# Patient Record
Sex: MALE | Race: OTHER | URBAN - METROPOLITAN AREA
[De-identification: names, ages, dates, MRNs, and addresses within clinical notes are randomized per-mention and may not be internally consistent; named-entity substitution may affect disease eponyms.]

---

## 2021-08-15 ENCOUNTER — INPATIENT (INPATIENT)
Facility: HOSPITAL | Age: 46
LOS: 3 days | Discharge: HOME | End: 2021-08-19
Attending: INTERNAL MEDICINE | Admitting: INTERNAL MEDICINE
Payer: COMMERCIAL

## 2021-08-15 VITALS
TEMPERATURE: 98 F | HEART RATE: 87 BPM | DIASTOLIC BLOOD PRESSURE: 120 MMHG | SYSTOLIC BLOOD PRESSURE: 217 MMHG | RESPIRATION RATE: 18 BRPM | WEIGHT: 255.07 LBS | OXYGEN SATURATION: 98 %

## 2021-08-15 LAB
ALBUMIN SERPL ELPH-MCNC: 4 G/DL — SIGNIFICANT CHANGE UP (ref 3.5–5.2)
ALP SERPL-CCNC: 72 U/L — SIGNIFICANT CHANGE UP (ref 30–115)
ALT FLD-CCNC: 19 U/L — SIGNIFICANT CHANGE UP (ref 0–41)
ANION GAP SERPL CALC-SCNC: 11 MMOL/L — SIGNIFICANT CHANGE UP (ref 7–14)
APPEARANCE UR: CLEAR — SIGNIFICANT CHANGE UP
APTT BLD: 34 SEC — SIGNIFICANT CHANGE UP (ref 27–39.2)
AST SERPL-CCNC: 22 U/L — SIGNIFICANT CHANGE UP (ref 0–41)
BACTERIA # UR AUTO: NEGATIVE — SIGNIFICANT CHANGE UP
BASOPHILS # BLD AUTO: 0.03 K/UL — SIGNIFICANT CHANGE UP (ref 0–0.2)
BASOPHILS NFR BLD AUTO: 0.4 % — SIGNIFICANT CHANGE UP (ref 0–1)
BILIRUB SERPL-MCNC: 0.2 MG/DL — SIGNIFICANT CHANGE UP (ref 0.2–1.2)
BILIRUB UR-MCNC: NEGATIVE — SIGNIFICANT CHANGE UP
BUN SERPL-MCNC: 26 MG/DL — HIGH (ref 10–20)
CALCIUM SERPL-MCNC: 9.9 MG/DL — SIGNIFICANT CHANGE UP (ref 8.5–10.1)
CHLORIDE SERPL-SCNC: 106 MMOL/L — SIGNIFICANT CHANGE UP (ref 98–110)
CO2 SERPL-SCNC: 25 MMOL/L — SIGNIFICANT CHANGE UP (ref 17–32)
COLOR SPEC: SIGNIFICANT CHANGE UP
CREAT SERPL-MCNC: 2.8 MG/DL — HIGH (ref 0.7–1.5)
DIFF PNL FLD: ABNORMAL
EOSINOPHIL # BLD AUTO: 0.17 K/UL — SIGNIFICANT CHANGE UP (ref 0–0.7)
EOSINOPHIL NFR BLD AUTO: 2.5 % — SIGNIFICANT CHANGE UP (ref 0–8)
EPI CELLS # UR: 1 /HPF — SIGNIFICANT CHANGE UP (ref 0–5)
GLUCOSE SERPL-MCNC: 96 MG/DL — SIGNIFICANT CHANGE UP (ref 70–99)
GLUCOSE UR QL: SIGNIFICANT CHANGE UP
HCT VFR BLD CALC: 41.1 % — LOW (ref 42–52)
HGB BLD-MCNC: 13.2 G/DL — LOW (ref 14–18)
HYALINE CASTS # UR AUTO: 2 /LPF — SIGNIFICANT CHANGE UP (ref 0–7)
IMM GRANULOCYTES NFR BLD AUTO: 0.3 % — SIGNIFICANT CHANGE UP (ref 0.1–0.3)
INR BLD: 0.9 RATIO — SIGNIFICANT CHANGE UP (ref 0.65–1.3)
KETONES UR-MCNC: NEGATIVE — SIGNIFICANT CHANGE UP
LEUKOCYTE ESTERASE UR-ACNC: NEGATIVE — SIGNIFICANT CHANGE UP
LYMPHOCYTES # BLD AUTO: 2.28 K/UL — SIGNIFICANT CHANGE UP (ref 1.2–3.4)
LYMPHOCYTES # BLD AUTO: 34.1 % — SIGNIFICANT CHANGE UP (ref 20.5–51.1)
MCHC RBC-ENTMCNC: 24 PG — LOW (ref 27–31)
MCHC RBC-ENTMCNC: 32.1 G/DL — SIGNIFICANT CHANGE UP (ref 32–37)
MCV RBC AUTO: 74.7 FL — LOW (ref 80–94)
MONOCYTES # BLD AUTO: 0.41 K/UL — SIGNIFICANT CHANGE UP (ref 0.1–0.6)
MONOCYTES NFR BLD AUTO: 6.1 % — SIGNIFICANT CHANGE UP (ref 1.7–9.3)
NEUTROPHILS # BLD AUTO: 3.77 K/UL — SIGNIFICANT CHANGE UP (ref 1.4–6.5)
NEUTROPHILS NFR BLD AUTO: 56.6 % — SIGNIFICANT CHANGE UP (ref 42.2–75.2)
NITRITE UR-MCNC: NEGATIVE — SIGNIFICANT CHANGE UP
NRBC # BLD: 0 /100 WBCS — SIGNIFICANT CHANGE UP (ref 0–0)
PH UR: 7.5 — SIGNIFICANT CHANGE UP (ref 5–8)
PLATELET # BLD AUTO: 207 K/UL — SIGNIFICANT CHANGE UP (ref 130–400)
POTASSIUM SERPL-MCNC: 4.1 MMOL/L — SIGNIFICANT CHANGE UP (ref 3.5–5)
POTASSIUM SERPL-SCNC: 4.1 MMOL/L — SIGNIFICANT CHANGE UP (ref 3.5–5)
PROT SERPL-MCNC: 6.8 G/DL — SIGNIFICANT CHANGE UP (ref 6–8)
PROT UR-MCNC: ABNORMAL
PROTHROM AB SERPL-ACNC: 10.4 SEC — SIGNIFICANT CHANGE UP (ref 9.95–12.87)
RBC # BLD: 5.5 M/UL — SIGNIFICANT CHANGE UP (ref 4.7–6.1)
RBC # FLD: 16 % — HIGH (ref 11.5–14.5)
RBC CASTS # UR COMP ASSIST: 8 /HPF — HIGH (ref 0–4)
SARS-COV-2 RNA SPEC QL NAA+PROBE: SIGNIFICANT CHANGE UP
SODIUM SERPL-SCNC: 142 MMOL/L — SIGNIFICANT CHANGE UP (ref 135–146)
SP GR SPEC: 1.01 — SIGNIFICANT CHANGE UP (ref 1.01–1.03)
UROBILINOGEN FLD QL: SIGNIFICANT CHANGE UP
WBC # BLD: 6.68 K/UL — SIGNIFICANT CHANGE UP (ref 4.8–10.8)
WBC # FLD AUTO: 6.68 K/UL — SIGNIFICANT CHANGE UP (ref 4.8–10.8)
WBC UR QL: 2 /HPF — SIGNIFICANT CHANGE UP (ref 0–5)

## 2021-08-15 PROCEDURE — 74176 CT ABD & PELVIS W/O CONTRAST: CPT | Mod: 26

## 2021-08-15 PROCEDURE — 99285 EMERGENCY DEPT VISIT HI MDM: CPT

## 2021-08-15 RX ORDER — AMLODIPINE BESYLATE 2.5 MG/1
10 TABLET ORAL DAILY
Refills: 0 | Status: DISCONTINUED | OUTPATIENT
Start: 2021-08-15 | End: 2021-08-19

## 2021-08-15 RX ORDER — SODIUM CHLORIDE 9 MG/ML
1000 INJECTION INTRAMUSCULAR; INTRAVENOUS; SUBCUTANEOUS ONCE
Refills: 0 | Status: COMPLETED | OUTPATIENT
Start: 2021-08-15 | End: 2021-08-15

## 2021-08-15 RX ORDER — LABETALOL HCL 100 MG
200 TABLET ORAL EVERY 8 HOURS
Refills: 0 | Status: DISCONTINUED | OUTPATIENT
Start: 2021-08-15 | End: 2021-08-16

## 2021-08-15 RX ORDER — HYDRALAZINE HCL 50 MG
10 TABLET ORAL ONCE
Refills: 0 | Status: COMPLETED | OUTPATIENT
Start: 2021-08-15 | End: 2021-08-15

## 2021-08-15 RX ORDER — ALLOPURINOL 300 MG
100 TABLET ORAL DAILY
Refills: 0 | Status: DISCONTINUED | OUTPATIENT
Start: 2021-08-15 | End: 2021-08-19

## 2021-08-15 RX ORDER — ATORVASTATIN CALCIUM 80 MG/1
20 TABLET, FILM COATED ORAL AT BEDTIME
Refills: 0 | Status: DISCONTINUED | OUTPATIENT
Start: 2021-08-15 | End: 2021-08-19

## 2021-08-15 RX ADMIN — SODIUM CHLORIDE 1000 MILLILITER(S): 9 INJECTION INTRAMUSCULAR; INTRAVENOUS; SUBCUTANEOUS at 14:19

## 2021-08-15 RX ADMIN — ATORVASTATIN CALCIUM 20 MILLIGRAM(S): 80 TABLET, FILM COATED ORAL at 21:11

## 2021-08-15 RX ADMIN — AMLODIPINE BESYLATE 10 MILLIGRAM(S): 2.5 TABLET ORAL at 17:08

## 2021-08-15 RX ADMIN — Medication 10 MILLIGRAM(S): at 19:07

## 2021-08-15 NOTE — H&P ADULT - NSHPLABSRESULTS_GEN_ALL_CORE
(08-15 @ 13:28)                      13.2  6.68 )-----------( 207                 41.1    Neutrophils = 3.77 (56.6%)  Lymphocytes = 2.28 (34.1%)  Eosinophils = 0.17 (2.5%)  Basophils = 0.03 (0.4%)  Monocytes = 0.41 (6.1%)  Bands = --%    08-15    142  |  106  |  26<H>  ----------------------------<  96  4.1   |  25  |  2.8<H>    Ca    9.9      15 Aug 2021 13:28    TPro  6.8  /  Alb  4.0  /  TBili  0.2  /  DBili  x   /  AST  22  /  ALT  19  /  AlkPhos  72  08-15    ( 15 Aug 2021 13:28 )   PT: 10.40 sec;   INR: 0.90 ratio;       PTT:34.0 sec      RVP:          Tox:         Urinalysis Basic - ( 15 Aug 2021 13:28 )    Color: Light Yellow / Appearance: Clear / S.013 / pH: x  Gluc: x / Ketone: Negative  / Bili: Negative / Urobili: <2 mg/dL   Blood: x / Protein: 300 mg/dL / Nitrite: Negative   Leuk Esterase: Negative / RBC: 8 /HPF / WBC 2 /HPF   Sq Epi: x / Non Sq Epi: 1 /HPF / Bacteria: Negative

## 2021-08-15 NOTE — H&P ADULT - NSHPPHYSICALEXAM_GEN_ALL_CORE
LOS:     VITALS:   T(C): 36.7 (08-15-21 @ 12:05), Max: 36.7 (08-15-21 @ 12:05)  HR: 87 (08-15-21 @ 12:05) (87 - 87)  BP: 170/80 (08-15-21 @ 14:41) (170/80 - 217/120)  RR: 18 (08-15-21 @ 12:05) (18 - 18)  SpO2: 98% (08-15-21 @ 12:05) (98% - 98%)    GENERAL: NAD, lying in bed comfortably  HEAD:  Atraumatic, Normocephalic  EYES: EOMI, PERRLA, conjunctiva and sclera clear  ENT: Moist mucous membranes  NECK: Supple, No JVD  CHEST/LUNG: Clear to auscultation bilaterally; No rales, rhonchi, wheezing, or rubs. Unlabored respirations  HEART: Regular rate and rhythm; No murmurs, rubs, or gallops  ABDOMEN: BSx4; Soft, nontender, nondistended  EXTREMITIES:  mild 1+ LLE  NERVOUS SYSTEM:  A&Ox3, no focal deficits   SKIN: No rashes or lesions

## 2021-08-15 NOTE — H&P ADULT - HISTORY OF PRESENT ILLNESS
45 year old man with hx of gout and HTN, presenting due to 2 week hx of hematuria. Patient reports that 2 weeks ago he started seeing some blood at the beginning of voiding. the hematuria is intermittent, not on a daily basis. with no other symptoms. no dysuria, urgency, frequency, back pain, or any other symptoms. no fever, chills, dyspnea, chest pain, diarrhea, hematochezia, melena, cough, or any other symptoms. patient hematuria has been the same since 2 weeks but not resolving. he drinks alcohol daily. non smoker. no FHx of renal disease. he reports his gout is under control with no flares in the past years. he is not sure why he has HTN and does not know if it is controlled or not    In the ED, his bp was 200/100. labs showed Cr. 2.8, with BUN 26. UA showed proteinuria and small hematuria.

## 2021-08-15 NOTE — ED PROVIDER NOTE - NS ED ROS FT
Constitutional: (-) fever  Eyes/ENT: (-) blurry vision, (-) epistaxis  Cardiovascular: (-) chest pain, (-) syncope  Respiratory: (-) cough, (-) shortness of breath  Gastrointestinal: (-) vomiting, (-) diarrhea  : (+) hematuria, (-) dysuria  Musculoskeletal: (-) neck pain, (-) back pain, (-) joint pain  Integumentary: (-) rash, (-) edema  Neurological: (-) headache, (-) altered mental status  Allergic/Immunologic: (-) pruritus

## 2021-08-15 NOTE — ED PROVIDER NOTE - CLINICAL SUMMARY MEDICAL DECISION MAKING FREE TEXT BOX
On initial evaluation patient noted to have a blood pressure of 213 systolic on evaluation which down trended to 173 systolic. 45-year-old male presents to the ED with complaints of hematuria. Concern for end organ damage. obtain labs. Unremarkable physical exam. Labs reviewed noted to have an elevated creatinine of 2.8 with unknown baseline. [place] of FORREST. UA positive for blood. Given fluids. Will admit for new onset FORREST secondary to hypertensive urgency.

## 2021-08-15 NOTE — ED ADULT NURSE NOTE - NSIMPLEMENTINTERV_GEN_ALL_ED
Implemented All Universal Safety Interventions:  [place] to call system. Call bell, personal items and telephone within reach. Instruct patient to call for assistance. Room bathroom lighting operational. Non-slip footwear when patient is off stretcher. Physically safe environment: no spills, clutter or unnecessary equipment. Stretcher in lowest position, wheels locked, appropriate side rails in place.

## 2021-08-15 NOTE — ED ADULT NURSE NOTE - OBJECTIVE STATEMENT
pt is a 45 year old man pw 2 week hx of hematuria. intermittent, not on a daily basis. with no other symptoms. no dysuria, urgency, frequency, back pain, or any other symptoms. no fever, chills, dyspnea, chest pain, diarrhea, hematochezia, melena, cough, or any other symptoms.

## 2021-08-15 NOTE — H&P ADULT - ATTENDING COMMENTS
45 year old man with hx of gout and HTN, presenting due to 2 week hx of hematuria noted with FORREST and HTN- admitted to r/o nephritic syndrome causes vs obstructive causes    - check repeat BMP  - CT abdomen/pelvis noncontrast  - US Renal and bladder  - Nephro consult  - Urology consult    ambulate  DVT proph    DC home when cleared by  and renal for outpt f/u- hopefully in 24 hrs

## 2021-08-15 NOTE — ED PROVIDER NOTE - OBJECTIVE STATEMENT
45y M pmh HTN, HLD, Gout presents for eval of hematuria. Pt has intermittent painless hematuria x2wks, no aggravating or relieving factors. Denies fever, ha, cp, sob, weakness, numbness, dysuria, hematuria, n/v/d/c, trauma

## 2021-08-15 NOTE — H&P ADULT - ASSESSMENT
45 year old man with hx of gout and HTN, presenting due to 2 week hx of hematuria    # FORREST and Hematuria -- r/o nephritic syndrome causes vs obstructive causes  - UA showing hematuria and proteinuria  - patient with uncontrolled HTN and mild edema  - Hb stable  - send urine lytes  - Urine protein/Creatinine ratio  - CT abdomen/pelvis noncontrast  - US Renal and bladder  - Nephro consult  - Urology consult      # Uncontrolled HTN  - unknown if primary or secondary HTN  - continue home amlodipine 10 mg qd  - holding Benazapril due to FORREST  - holding Chlorthalidone for now (patient reports he doesn't take it but pharmacy said he does)  - give labetolol or hydralazine if bp is uncontrolled    # Gout  - continue home allopurinol  - check uric acid level    # DVT prophylaxis: SCD  # GI proph: not needed  # Diet: DASH  # Dispo: acute   45 year old man with hx of gout and HTN, presenting due to 2 week hx of hematuria    # FORREST and Hematuria -- r/o nephritic syndrome causes vs obstructive causes  - UA showing hematuria and proteinuria  - patient with uncontrolled HTN and mild edema  - Hb stable  - send urine lytes  - Urine protein/Creatinine ratio  - CT abdomen/pelvis noncontrast  - US Renal and bladder  - Nephro consult  - Urology consult  - will send the following:  Serum C3 and C4 complement levels  Antineutrophil cytoplasmic autoantibodies (ANCA; using enzyme-linked immunosorbent assays [ELISAs] specific for proteinase-3 and myeloperoxidase)  Anti-glomerular basement membrane (GBM) autoantibodies  Antinuclear antibodies  Anti-dsDNA antibodies  Serology for hepatitis C virus, hepatitis B virus, and HIV  Serum free light chains and serum immunofixation      # Uncontrolled HTN  - unknown if primary or secondary HTN  - continue home amlodipine 10 mg qd  - holding Benazapril due to FORREST  - holding Chlorthalidone for now (patient reports he doesn't take it but pharmacy said he does)  - give labetolol or hydralazine if bp is uncontrolled    # Gout  - continue home allopurinol  - check uric acid level    # DVT prophylaxis: SCD  # GI proph: not needed  # Diet: DASH  # Dispo: acute   45 year old man with hx of gout and HTN, presenting due to 2 week hx of hematuria    # FORREST and Hematuria -- r/o nephritic syndrome causes vs obstructive causes  - UA showing hematuria and proteinuria  - patient with uncontrolled HTN and mild edema  - Hb stable  - send urine lytes  - Urine protein/Creatinine ratio  - CT abdomen/pelvis noncontrast  - US Renal and bladder  - Nephro consult  - Urology consult  - will send the following:  Serum C3 and C4 complement levels  Antineutrophil cytoplasmic autoantibodies (ANCA; using enzyme-linked immunosorbent assays [ELISAs] specific for proteinase-3 and myeloperoxidase)  Anti-glomerular basement membrane (GBM) autoantibodies  Antinuclear antibodies  Anti-dsDNA antibodies  Serology for hepatitis C virus, hepatitis B virus, and HIV  Serum free light chains and serum immunofixation      # Uncontrolled HTN  - unknown if primary or secondary HTN  - continue home amlodipine 10 mg qd  - holding Benazapril due to FORREST  - holding Chlorthalidone for now (patient reports he doesn't take it but pharmacy said he does)  - give labetolol or hydralazine if bp is uncontrolled  - do renal duplex Renal to r/o LV    # Gout  - continue home allopurinol  - check uric acid level    # DVT prophylaxis: SCD  # GI proph: not needed  # Diet: DASH  # Dispo: acute

## 2021-08-16 LAB
ALBUMIN SERPL ELPH-MCNC: 3.9 G/DL — SIGNIFICANT CHANGE UP (ref 3.5–5.2)
ALP SERPL-CCNC: 71 U/L — SIGNIFICANT CHANGE UP (ref 30–115)
ALT FLD-CCNC: 19 U/L — SIGNIFICANT CHANGE UP (ref 0–41)
ANION GAP SERPL CALC-SCNC: 12 MMOL/L — SIGNIFICANT CHANGE UP (ref 7–14)
AST SERPL-CCNC: 22 U/L — SIGNIFICANT CHANGE UP (ref 0–41)
BILIRUB SERPL-MCNC: 0.6 MG/DL — SIGNIFICANT CHANGE UP (ref 0.2–1.2)
BUN SERPL-MCNC: 26 MG/DL — HIGH (ref 10–20)
CALCIUM SERPL-MCNC: 9.8 MG/DL — SIGNIFICANT CHANGE UP (ref 8.5–10.1)
CHLORIDE SERPL-SCNC: 104 MMOL/L — SIGNIFICANT CHANGE UP (ref 98–110)
CO2 SERPL-SCNC: 24 MMOL/L — SIGNIFICANT CHANGE UP (ref 17–32)
CREAT ?TM UR-MCNC: 107 MG/DL — SIGNIFICANT CHANGE UP
CREAT SERPL-MCNC: 2.6 MG/DL — HIGH (ref 0.7–1.5)
CULTURE RESULTS: NO GROWTH — SIGNIFICANT CHANGE UP
GLUCOSE SERPL-MCNC: 102 MG/DL — HIGH (ref 70–99)
HCT VFR BLD CALC: 43.3 % — SIGNIFICANT CHANGE UP (ref 42–52)
HGB BLD-MCNC: 13.9 G/DL — LOW (ref 14–18)
MAGNESIUM SERPL-MCNC: 1.8 MG/DL — SIGNIFICANT CHANGE UP (ref 1.8–2.4)
MCHC RBC-ENTMCNC: 23.9 PG — LOW (ref 27–31)
MCHC RBC-ENTMCNC: 32.1 G/DL — SIGNIFICANT CHANGE UP (ref 32–37)
MCV RBC AUTO: 74.4 FL — LOW (ref 80–94)
NRBC # BLD: 0 /100 WBCS — SIGNIFICANT CHANGE UP (ref 0–0)
PCP SPEC-MCNC: SIGNIFICANT CHANGE UP
PLATELET # BLD AUTO: 224 K/UL — SIGNIFICANT CHANGE UP (ref 130–400)
POTASSIUM SERPL-MCNC: 4 MMOL/L — SIGNIFICANT CHANGE UP (ref 3.5–5)
POTASSIUM SERPL-SCNC: 4 MMOL/L — SIGNIFICANT CHANGE UP (ref 3.5–5)
PROT ?TM UR-MCNC: 71 MG/DLG/24H — SIGNIFICANT CHANGE UP
PROT ?TM UR-MCNC: 71 MG/DLG/24H — SIGNIFICANT CHANGE UP
PROT SERPL-MCNC: 6.8 G/DL — SIGNIFICANT CHANGE UP (ref 6–8)
PROT/CREAT UR-RTO: 0.7 RATIO — HIGH (ref 0–0.2)
RBC # BLD: 5.82 M/UL — SIGNIFICANT CHANGE UP (ref 4.7–6.1)
RBC # FLD: 17.1 % — HIGH (ref 11.5–14.5)
SODIUM SERPL-SCNC: 140 MMOL/L — SIGNIFICANT CHANGE UP (ref 135–146)
SPECIMEN SOURCE: SIGNIFICANT CHANGE UP
URATE SERPL-MCNC: 8.2 MG/DL — SIGNIFICANT CHANGE UP (ref 3.4–8.8)
WBC # BLD: 10.48 K/UL — SIGNIFICANT CHANGE UP (ref 4.8–10.8)
WBC # FLD AUTO: 10.48 K/UL — SIGNIFICANT CHANGE UP (ref 4.8–10.8)

## 2021-08-16 PROCEDURE — 93975 VASCULAR STUDY: CPT | Mod: 26

## 2021-08-16 PROCEDURE — 76775 US EXAM ABDO BACK WALL LIM: CPT | Mod: 26

## 2021-08-16 RX ORDER — HYDRALAZINE HCL 50 MG
5 TABLET ORAL ONCE
Refills: 0 | Status: COMPLETED | OUTPATIENT
Start: 2021-08-16 | End: 2021-08-16

## 2021-08-16 RX ORDER — ACETAMINOPHEN 500 MG
650 TABLET ORAL EVERY 8 HOURS
Refills: 0 | Status: DISCONTINUED | OUTPATIENT
Start: 2021-08-16 | End: 2021-08-19

## 2021-08-16 RX ORDER — LABETALOL HCL 100 MG
400 TABLET ORAL EVERY 8 HOURS
Refills: 0 | Status: DISCONTINUED | OUTPATIENT
Start: 2021-08-16 | End: 2021-08-19

## 2021-08-16 RX ADMIN — Medication 5 MILLIGRAM(S): at 06:30

## 2021-08-16 RX ADMIN — AMLODIPINE BESYLATE 10 MILLIGRAM(S): 2.5 TABLET ORAL at 05:03

## 2021-08-16 RX ADMIN — ATORVASTATIN CALCIUM 20 MILLIGRAM(S): 80 TABLET, FILM COATED ORAL at 22:29

## 2021-08-16 RX ADMIN — Medication 650 MILLIGRAM(S): at 06:23

## 2021-08-16 RX ADMIN — Medication 400 MILLIGRAM(S): at 22:30

## 2021-08-16 RX ADMIN — Medication 100 MILLIGRAM(S): at 11:35

## 2021-08-16 RX ADMIN — Medication 200 MILLIGRAM(S): at 05:03

## 2021-08-16 RX ADMIN — Medication 650 MILLIGRAM(S): at 05:07

## 2021-08-16 RX ADMIN — Medication 400 MILLIGRAM(S): at 14:13

## 2021-08-16 NOTE — CONSULT NOTE ADULT - ATTENDING COMMENTS
Attempted to see patient twice today / not in room / will attempt to see in AM   Case discussed with medical resident and renal resident   Agree with note A and plan   # FORREST/ HTN / hematuria / ? proteinuria  rule out nephritic syndrome   ok to use CCB and beta blocker for BP management  CT and sono no evidence of mass or stone-obstruction   GN work up as

## 2021-08-16 NOTE — CONSULT NOTE ADULT - ASSESSMENT
45 year old man with hx of gout and HTN, presents due to 2 week hx of hematuria. Found to have 300 mg/dL protein + small blood on UA    #) Nephrotic vs. possible nephritic syndrome  - With long standing nephrotic syndrome, would expect a low albumin level and potentially darren coagulopathy (not seen here)   - No evidence of systemic disease at this time (e.g. amyloidosis, lupus)  - May have a manifestation of acute injury - given his high blood pressure, potential for hypertensive emergency  - Still, his hypertension may be a result of kidney disease rather than a cause. Recommend the following:                --> BP control               --> 24 H urine protein collection, lipid panel, complement studies, hepatitis panel, BIBIANA, ANCA, HIV, immunoglobulins, double-stranded DNA               --> Imaging studies may be useful; CT abdomen/pelvis performed (results pending), US renal and VA duplex kidneys pending                --> If above does not elucidate an etiology, kidney biopsy may be needed (differential includes focal segmental glomerulopathy, membranous nephropathy)      45 year old man with hx of gout and HTN, presents due to 2 week hx of hematuria. Found to have 300 mg/dL protein + small blood on UA    #) Nephrotic vs. possible nephritic syndrome  - With long standing nephrotic syndrome, would expect a low albumin level and potentially darren coagulopathy (not seen here)   - No evidence of systemic disease at this time (e.g. amyloidosis, lupus)  - May have a manifestation of acute injury - given his high blood pressure, potential for hypertensive emergency  - Still, his hypertension may be a result of kidney disease rather than a cause. Recommend the following:                --> BP control               --> 24 H urine protein collection, lipid panel, complement studies, hepatitis panel, BIBIANA, ANCA, HIV, immunoglobulins, double-stranded DNA               --> Imaging studies may be useful; CT abdomen/pelvis performed (results pending), US renal and VA duplex kidneys pending                --> If above does not elucidate an etiology, kidney biopsy may be needed (differential includes focal segmental glomerulopathy, membranous nephropathy)     **PENDING ATTENDING REVIEW   45 year old man with hx of gout and HTN, presents due to 2 week hx of hematuria. Found to have 300 mg/dL protein + small blood on UA    #) Nephrotic vs. possible nephritic syndrome  - With long standing nephrotic syndrome, would expect a low albumin level and potentially some coagulopathy (not seen here)   - No evidence of systemic disease at this time (e.g. amyloidosis, lupus)  - May have a manifestation of acute injury - given his high blood pressure, potential for hypertensive emergency  - Still, his hypertension may be a result of kidney disease rather than a cause. Recommend the following:                --> BP control               --> 24 H urine protein collection, lipid panel, complement studies, hepatitis panel, BIBIANA, ANCA, HIV, immunoglobulins, double-stranded DNA               --> Imaging studies may be useful; CT abdomen/pelvis performed (results pending), US renal and VA duplex kidneys pending                --> If above does not elucidate an etiology, kidney biopsy may be needed (differential includes focal segmental glomerulopathy, membranous nephropathy)     **PENDING ATTENDING REVIEW   45 year old man with hx of gout and HTN, presents due to 2 week hx of hematuria. Found to have 300 mg/dL protein + small blood on UA    #) Nephritic syndrome  - With long standing nephrotic syndrome, would expect a low albumin level and potentially some coagulopathy (not seen here)   - No evidence of systemic disease at this time (e.g. amyloidosis, lupus)  - May have a manifestation of acute injury - given his high blood pressure, potential for hypertensive emergency  - Still, his hypertension may be a result of kidney disease rather than a cause. Recommend the following:                --> BP control               --> 24 H urine protein collection, urine protein/creatinine ratio, lipid panel, C3/C4, hepatitis panel, BIBIANA, RF, ANCA, HIV, immunoglobulins, double-stranded DNA, anti-gbm               --> Imaging studies may be useful; CT abdomen/pelvis performed (results pending), US renal and VA duplex kidneys pending                --> If above does not elucidate an etiology, kidney biopsy may be needed     **PENDING ATTENDING REVIEW   45 year old man with hx of gout and HTN, presents due to 2 week hx of hematuria. Found to have 300 mg/dL protein + small blood on UA    #) Nephritic syndrome  - With long standing nephrotic syndrome, would expect a low albumin level and potentially some coagulopathy (not seen here)   - No evidence of systemic disease at this time (e.g. amyloidosis, lupus)  - May have a manifestation of acute injury - given his high blood pressure, potential for hypertensive emergency  - Still, his hypertension may be a result of kidney disease rather than a cause. Recommend the following:                --> BP control               --> spot urine protein/creatinine ratio, lipid panel, C3/C4, hepatitis panel, BIBIANA, RF, ANCA, HIV, immunoglobulins, double-stranded DNA, anti-gbm               --> Imaging studies may be useful; CT abdomen/pelvis performed (results pending), US renal and VA duplex kidneys pending                --> If above does not elucidate an etiology, kidney biopsy may be needed     **PENDING ATTENDING REVIEW   45 year old man with hx of gout and HTN, presents due to 2 week hx of hematuria. Found to have 300 mg/dL protein + small blood on UA    #) Nephritic syndrome  - With long standing nephrotic syndrome, would expect a low albumin level and potentially some coagulopathy (not seen here)   - No evidence of systemic disease at this time (e.g. amyloidosis, lupus)  - May have a manifestation of acute injury - given his high blood pressure, potential for hypertensive emergency  - Still, his hypertension may be a result of kidney disease rather than a cause. Recommend the following:                --> BP control               --> spot urine protein/creatinine ratio, lipid panel, C3/C4, hepatitis panel, BIBIANA, RF, ANCA, HIV, immunoglobulins, double-stranded DNA, anti-gbm               --> Imaging studies may be useful; CT abdomen/pelvis performed (results pending), US renal and VA duplex kidneys pending                --> If above does not elucidate an etiology, kidney biopsy may be needed

## 2021-08-16 NOTE — PROGRESS NOTE ADULT - SUBJECTIVE AND OBJECTIVE BOX
JEOVANY HANSON 45y Male  MRN#: [id number]      Pt is currently admitted with the primary diagnosis of HTN and FORREST.    SUBJECTIVE  Hospital Day: 1d  Hospital Course: 45 year old man with hx of gout and HTN, presenting due to 2 week hx of hematuria. Patient reports that 2 weeks ago he started seeing some blood at the beginning of voiding. the hematuria is intermittent, not on a daily basis. with no other symptoms. no dysuria, urgency, frequency, back pain, or any other symptoms. no fever, chills, dyspnea, chest pain, diarrhea, hematochezia, melena, cough, or any other symptoms. patient hematuria has been the same since 2 weeks but not resolving. he drinks alcohol daily. non smoker. no FHx of renal disease. he reports his gout is under control with no flares in the past years. He states his blood pressure normally runs high.  In the ED, his bp was 200/100. labs showed Cr. 2.8, with BUN 26. UA showed proteinuria and small hematuria.    Patient is pending a nephrology and urology c/s. CT abdomen/pelvis noncontrast performed pending read. US Renal and bladder pending.     Overnight events : None.    Subjective complaints: Pt c/o of persistent headache.                                            ----------------------------------------------------------  OBJECTIVE        PAST MEDICAL & SURGICAL HISTORY  HTN (hypertension)    Gout                                              -----------------------------------------------------------  ALLERGIES:  No Known Allergies                                            ------------------------------------------------------------    HOME MEDICATIONS  Home Medications:  allopurinol 100 mg oral tablet: 1 tab(s) orally once a day (15 Aug 2021 16:15)  amlodipine-benazepril 10 mg-40 mg oral capsule: 1 cap(s) orally once a day (15 Aug 2021 16:15)  atorvastatin 20 mg oral tablet: 1 tab(s) orally once a day (15 Aug 2021 16:15)  chlorthalidone 25 mg oral tablet: 1 tab(s) orally once a day (15 Aug 2021 16:15)                           MEDICATIONS:  STANDING MEDICATIONS  allopurinol 100 milliGRAM(s) Oral daily  amLODIPine   Tablet 10 milliGRAM(s) Oral daily  atorvastatin 20 milliGRAM(s) Oral at bedtime  labetalol 200 milliGRAM(s) Oral every 8 hours    PRN MEDICATIONS  acetaminophen   Tablet .. 650 milliGRAM(s) Oral every 8 hours PRN                                            ------------------------------------------------------------  VITAL SIGNS: Last 24 Hours  T(C): 36.7 (16 Aug 2021 04:55), Max: 36.7 (15 Aug 2021 12:05)  T(F): 98.1 (16 Aug 2021 04:55), Max: 98.1 (16 Aug 2021 04:55)  HR: 83 (16 Aug 2021 06:16) (83 - 91)  BP: 206/121 (16 Aug 2021 06:16) (127/93 - 242/132)  BP(mean): --  RR: 18 (16 Aug 2021 04:55) (18 - 18)  SpO2: 98% (16 Aug 2021 07:57) (96% - 100%)                                             --------------------------------------------------------------  LABS:                        13.2   6.68  )-----------( 207      ( 15 Aug 2021 13:28 )             41.1     08-15    142  |  106  |  26<H>  ----------------------------<  96  4.1   |  25  |  2.8<H>    Ca    9.9      15 Aug 2021 13:28    TPro  6.8  /  Alb  4.0  /  TBili  0.2  /  DBili  x   /  AST  22  /  ALT  19  /  AlkPhos  72  08-15    PT/INR - ( 15 Aug 2021 13:28 )   PT: 10.40 sec;   INR: 0.90 ratio         PTT - ( 15 Aug 2021 13:28 )  PTT:34.0 sec  Urinalysis Basic - ( 15 Aug 2021 13:28 )    Color: Light Yellow / Appearance: Clear / S.013 / pH: x  Gluc: x / Ketone: Negative  / Bili: Negative / Urobili: <2 mg/dL   Blood: x / Protein: 300 mg/dL / Nitrite: Negative   Leuk Esterase: Negative / RBC: 8 /HPF / WBC 2 /HPF   Sq Epi: x / Non Sq Epi: 1 /HPF / Bacteria: Negative                                                            -------------------------------------------------------------  RADIOLOGY:                                             --------------------------------------------------------------    PHYSICAL EXAM:  General: well-appearing, NAD  LUNGS: clear lung sounds b/l  HEART: + S1, S2, RRR, no rubs, no murmurs, no gallops  ABDOMEN: BS+ 4 quads, NTND  EXT: distal pulses 2+ b/l  NEURO: AAOx4  SKIN: no abrasions or lesions, warm, well-perfused                                           --------------------------------------------------------------    ASSESSMENT & PLAN    Past medical history and hospital course    45 year old man with hx of gout and HTN, presenting due to 2 week hx of hematuria    # FORREST and Hematuria -- r/o nephritic syndrome causes vs obstructive causes  - UA showing hematuria and proteinuria  - patient with uncontrolled HTN and mild edema  - Hb stable  - send urine lytes  - Urine protein/Creatinine ratio  - CT abdomen/pelvis noncontrast  - US Renal and bladder  - Nephro consult  - Urology consult  - will send the following:  Serum C3 and C4 complement levels  Antineutrophil cytoplasmic autoantibodies (ANCA; using enzyme-linked immunosorbent assays [ELISAs] specific for proteinase-3 and myeloperoxidase)  Anti-glomerular basement membrane (GBM) autoantibodies  Antinuclear antibodies  Anti-dsDNA antibodies  Serology for hepatitis C virus, hepatitis B virus, and HIV  Serum free light chains and serum immunofixation      # Uncontrolled HTN  - unknown if primary or secondary HTN  - continue home amlodipine 10 mg qd  - holding Benazapril due to FORREST  - holding Chlorthalidone for now (patient reports he doesn't take it but pharmacy said he does)  - give labetolol or hydralazine if bp is uncontrolled  - do renal duplex Renal to r/o LV    # Gout  - continue home allopurinol  - check uric acid level    # DVT prophylaxis: SCD  # GI proph: not needed  # Diet: DASH  # Dispo: acute   JEOVANY HANSON 45y Male  MRN#: [id number]      Pt is currently admitted with the primary diagnosis of HTN and FORREST.    SUBJECTIVE  Hospital Day: 1d  Hospital Course: 45 year old man with hx of gout and HTN, presenting due to 2 week hx of hematuria. Patient reports that 2 weeks ago he started seeing some blood at the beginning of voiding. the hematuria is intermittent, not on a daily basis. with no other symptoms. no dysuria, urgency, frequency, back pain, or any other symptoms. no fever, chills, dyspnea, chest pain, diarrhea, hematochezia, melena, cough, or any other symptoms. patient hematuria has been the same since 2 weeks but not resolving. he drinks alcohol daily. non smoker. no FHx of renal disease. he reports his gout is under control with no flares in the past years. He states his blood pressure normally runs high.  In the ED, his bp was 200/100. labs showed Cr. 2.8, with BUN 26. UA showed proteinuria and small hematuria.    Patient is pending urology c/s. CT abdomen/pelvis noncontrast was negative for any acute intraabdominal pathology. US Renal and bladder was negative. [facility] renal duplex showed no stenosis. Nephrology recommended full w/u of possible nephritic syndrome causes. In the event of inconclusive results, nephrology recommends possible kidney biospy.    Overnight events : None.    Subjective complaints: Pt c/o of persistent headache.                                            ----------------------------------------------------------  OBJECTIVE        PAST MEDICAL & SURGICAL HISTORY  HTN (hypertension)    Gout                                              -----------------------------------------------------------  ALLERGIES:  No Known Allergies                                            ------------------------------------------------------------    HOME MEDICATIONS  Home Medications:  allopurinol 100 mg oral tablet: 1 tab(s) orally once a day (15 Aug 2021 16:15)  amlodipine-benazepril 10 mg-40 mg oral capsule: 1 cap(s) orally once a day (15 Aug 2021 16:15)  atorvastatin 20 mg oral tablet: 1 tab(s) orally once a day (15 Aug 2021 16:15)  chlorthalidone 25 mg oral tablet: 1 tab(s) orally once a day (15 Aug 2021 16:15)                           MEDICATIONS:  STANDING MEDICATIONS  allopurinol 100 milliGRAM(s) Oral daily  amLODIPine   Tablet 10 milliGRAM(s) Oral daily  atorvastatin 20 milliGRAM(s) Oral at bedtime  labetalol 200 milliGRAM(s) Oral every 8 hours    PRN MEDICATIONS  acetaminophen   Tablet .. 650 milliGRAM(s) Oral every 8 hours PRN                                            ------------------------------------------------------------  VITAL SIGNS: Last 24 Hours  T(C): 36.7 (16 Aug 2021 04:55), Max: 36.7 (15 Aug 2021 12:05)  T(F): 98.1 (16 Aug 2021 04:55), Max: 98.1 (16 Aug 2021 04:55)  HR: 83 (16 Aug 2021 06:16) (83 - 91)  BP: 206/121 (16 Aug 2021 06:16) (127/93 - 242/132)  BP(mean): --  RR: 18 (16 Aug 2021 04:55) (18 - 18)  SpO2: 98% (16 Aug 2021 07:57) (96% - 100%)                                             --------------------------------------------------------------  LABS:                        13.2   6.68  )-----------( 207      ( 15 Aug 2021 13:28 )             41.1     08-15    142  |  106  |  26<H>  ----------------------------<  96  4.1   |  25  |  2.8<H>    Ca    9.9      15 Aug 2021 13:28    TPro  6.8  /  Alb  4.0  /  TBili  0.2  /  DBili  x   /  AST  22  /  ALT  19  /  AlkPhos  72  08-15    PT/INR - ( 15 Aug 2021 13:28 )   PT: 10.40 sec;   INR: 0.90 ratio         PTT - ( 15 Aug 2021 13:28 )  PTT:34.0 sec  Urinalysis Basic - ( 15 Aug 2021 13:28 )    Color: Light Yellow / Appearance: Clear / S.013 / pH: x  Gluc: x / Ketone: Negative  / Bili: Negative / Urobili: <2 mg/dL   Blood: x / Protein: 300 mg/dL / Nitrite: Negative   Leuk Esterase: Negative / RBC: 8 /HPF / WBC 2 /HPF   Sq Epi: x / Non Sq Epi: 1 /HPF / Bacteria: Negative                                                            -------------------------------------------------------------  RADIOLOGY:    CT A/P (08/15): No CT evidence for acute intra-abdominal or pelvic pathology.    Renal U/S (): No hydronephrosis.    [facility] duplex (): No evidence of significant hemodynamic stenosis noted in bilateral renal arteries.                                            --------------------------------------------------------------    PHYSICAL EXAM:  General: well-appearing, NAD  LUNGS: clear lung sounds b/l  HEART: + S1, S2, RRR, no rubs, no murmurs, no gallops  ABDOMEN: BS+ 4 quads, NTND  EXT: distal pulses 2+ b/l, no edema  NEURO: AAOx4  SKIN: no abrasions or lesions, warm, well-perfused                                           --------------------------------------------------------------    ASSESSMENT & PLAN    Past medical history and hospital course    45 year old man with hx of gout and HTN, presenting due to 2 week hx of hematuria    # FORREST and Hematuria -- r/o nephritic syndrome causes vs obstructive causes  - UA showing hematuria and proteinuria  - patient with uncontrolled HTN and mild edema  - Hb stable  - send urine lytes  - Urine protein/Creatinine ratio  - CT abdomen/pelvis noncontrast  - US Renal and bladder  - Nephro consult  - Urology consult  - will send the following:  Serum C3 and C4 complement levels  Antineutrophil cytoplasmic autoantibodies (ANCA; using enzyme-linked immunosorbent assays [ELISAs] specific for proteinase-3 and myeloperoxidase)  Anti-glomerular basement membrane (GBM) autoantibodies  Antinuclear antibodies  Anti-dsDNA antibodies  Serology for hepatitis C virus, hepatitis B virus, and HIV  Serum free light chains and serum immunofixation      # Uncontrolled HTN  - unknown if primary or secondary HTN  - continue home amlodipine 10 mg qd  - holding Benazapril due to FORREST  - holding Chlorthalidone for now (patient reports he doesn't take it but pharmacy said he does)  - give labetolol or hydralazine if bp is uncontrolled  - do renal duplex Renal to r/o LV    # Gout  - continue home allopurinol  - check uric acid level    # DVT prophylaxis: SCD  # GI proph: not needed  # Diet: DASH  # Dispo: acute   JEOVANY HANSON 45y Male  MRN#: [id number]      Pt is currently admitted with the primary diagnosis of HTN and FORREST.    SUBJECTIVE  Hospital Day: 1d  Hospital Course: 45 year old man with hx of gout and HTN, presenting due to 2 week hx of hematuria. Patient reports that 2 weeks ago he started seeing some blood at the beginning of voiding. the hematuria is intermittent, not on a daily basis. with no other symptoms. no dysuria, urgency, frequency, back pain, or any other symptoms. no fever, chills, dyspnea, chest pain, diarrhea, hematochezia, melena, cough, or any other symptoms. patient hematuria has been the same since 2 weeks but not resolving. he drinks alcohol daily. non smoker. no FHx of renal disease. he reports his gout is under control with no flares in the past years. He states his blood pressure normally runs high.  In the ED, his bp was 200/100. labs showed Cr. 2.8, with BUN 26. UA showed proteinuria and small hematuria.    Patient is pending urology c/s. CT abdomen/pelvis noncontrast was negative for any acute intraabdominal pathology. US Renal and bladder was negative. [facility] renal duplex showed no stenosis. Nephrology recommended full w/u of possible nephritic syndrome causes. In the event of inconclusive results, nephrology recommends possible kidney biospy.    Overnight events : None.    Subjective complaints: Pt c/o of persistent headache.                                            ----------------------------------------------------------  OBJECTIVE        PAST MEDICAL & SURGICAL HISTORY  HTN (hypertension)    Gout                                              -----------------------------------------------------------  ALLERGIES:  No Known Allergies                                            ------------------------------------------------------------    HOME MEDICATIONS  Home Medications:  allopurinol 100 mg oral tablet: 1 tab(s) orally once a day (15 Aug 2021 16:15)  amlodipine-benazepril 10 mg-40 mg oral capsule: 1 cap(s) orally once a day (15 Aug 2021 16:15)  atorvastatin 20 mg oral tablet: 1 tab(s) orally once a day (15 Aug 2021 16:15)  chlorthalidone 25 mg oral tablet: 1 tab(s) orally once a day (15 Aug 2021 16:15)                           MEDICATIONS:  STANDING MEDICATIONS  allopurinol 100 milliGRAM(s) Oral daily  amLODIPine   Tablet 10 milliGRAM(s) Oral daily  atorvastatin 20 milliGRAM(s) Oral at bedtime  labetalol 200 milliGRAM(s) Oral every 8 hours    PRN MEDICATIONS  acetaminophen   Tablet .. 650 milliGRAM(s) Oral every 8 hours PRN                                            ------------------------------------------------------------  VITAL SIGNS: Last 24 Hours  T(C): 36.7 (16 Aug 2021 04:55), Max: 36.7 (15 Aug 2021 12:05)  T(F): 98.1 (16 Aug 2021 04:55), Max: 98.1 (16 Aug 2021 04:55)  HR: 83 (16 Aug 2021 06:16) (83 - 91)  BP: 206/121 (16 Aug 2021 06:16) (127/93 - 242/132)  BP(mean): --  RR: 18 (16 Aug 2021 04:55) (18 - 18)  SpO2: 98% (16 Aug 2021 07:57) (96% - 100%)                                             --------------------------------------------------------------  LABS:                        13.2   6.68  )-----------( 207      ( 15 Aug 2021 13:28 )             41.1     08-15    142  |  106  |  26<H>  ----------------------------<  96  4.1   |  25  |  2.8<H>    Ca    9.9      15 Aug 2021 13:28    TPro  6.8  /  Alb  4.0  /  TBili  0.2  /  DBili  x   /  AST  22  /  ALT  19  /  AlkPhos  72  08-15    PT/INR - ( 15 Aug 2021 13:28 )   PT: 10.40 sec;   INR: 0.90 ratio         PTT - ( 15 Aug 2021 13:28 )  PTT:34.0 sec  Urinalysis Basic - ( 15 Aug 2021 13:28 )    Color: Light Yellow / Appearance: Clear / S.013 / pH: x  Gluc: x / Ketone: Negative  / Bili: Negative / Urobili: <2 mg/dL   Blood: x / Protein: 300 mg/dL / Nitrite: Negative   Leuk Esterase: Negative / RBC: 8 /HPF / WBC 2 /HPF   Sq Epi: x / Non Sq Epi: 1 /HPF / Bacteria: Negative                                                            -------------------------------------------------------------  RADIOLOGY:    CT A/P (08/15): No CT evidence for acute intra-abdominal or pelvic pathology.    Renal U/S (): No hydronephrosis.    [facility] duplex (): No evidence of significant hemodynamic stenosis noted in bilateral renal arteries.                                            --------------------------------------------------------------    PHYSICAL EXAM:  General: well-appearing, NAD  LUNGS: clear lung sounds b/l  HEART: + S1, S2, RRR, no rubs, no murmurs, no gallops  ABDOMEN: BS+ 4 quads, NTND  EXT: distal pulses 2+ b/l, no edema  NEURO: AAOx4  SKIN: no abrasions or lesions, warm, well-perfused                                           --------------------------------------------------------------    ASSESSMENT & PLAN    Past medical history and hospital course    45 year old man with hx of gout and HTN, presenting due to 2 week hx of hematuria    # FORREST and Hematuria -- r/o nephritic syndrome causes vs obstructive causes  - UA showing hematuria and proteinuria  - patient with uncontrolled HTN and mild edema  - Hb stable  - send urine lytes  - Urine protein/Creatinine ratio  - CT abdomen/pelvis noncontrast (8/15/2021):  No CT evidence for acute intra-abdominal or pelvic pathology.  - US Renal and bladder (8/15/21): no hydronephrosis  - Uro consult   - Nephro consult: Serum C3 and C4 complement levels  ANCA; using enzyme-linked immunosorbent assays [ELISAs] specific for proteinase-3 and myeloperoxidase)  Anti GBM ABs   Antinuclear antibodies  Anti-dsDNA antibodies  Serology for hepatitis C virus, hepatitis B virus, and HIV  Serum free light chains and serum immunofixation      # Uncontrolled HTN  - c/w amlodipine 10mg OD (might swtich to nifedipine if BP not controlled)   - increase dose of lebatalol to 400mg Q8H   - holding Benazapril due to FORREST  - holding Chlorthalidone for now (patient reports he doesn't take it but pharmacy said he does)  - if high BP consider giving hydralazine     # Gout  - continue home allopurinol  - check uric acid level    # DVT prophylaxis: SCD  # GI proph: not needed  # Diet: DASH  # Dispo: acute

## 2021-08-16 NOTE — CONSULT NOTE ADULT - SUBJECTIVE AND OBJECTIVE BOX
NEPHROLOGY CONSULTATION NOTE    45 year old man with hx of gout and HTN, presenting due to 2 week hx of hematuria. Patient reports that 2 weeks ago he started seeing some blood at the beginning of voiding. the hematuria is intermittent, not on a daily basis. with no other symptoms. no dysuria, urgency, frequency, back pain, or any other symptoms. no fever, chills, dyspnea, chest pain, diarrhea, hematochezia, melena, cough, or any other symptoms. patient hematuria has been the same since 2 weeks but not resolving. he drinks alcohol daily. non smoker. no FHx of renal disease. he reports his gout is under control with no flares in the past years. he is not sure why he has HTN and does not know if it is controlled or not    In the ED, his bp was 200/100. labs showed Cr. 2.8, with BUN 26. UA showed proteinuria and small hematuria.    NEPHROLOGY HPI:   Patient states he has had about two weeks of hematuria- most notable at the initiation of voiding. He denies any other overt symptoms (e.g. pain with urination, abdominal/pelvic pain, recent illness etc.). He does state that he stopped taking his anti-hypertensive medications last week (amlodipine + labetalol) because he ran out, but he does not ever recall his BP being this high. Normally, he is compliant with his medications. He does state that he had some routine blood-work taken over one month ago that his PCP (Dr. Ramirez) said showed some evidence of kidney damage, but patient did not follow-up. Patient does not smoke. On ROS he notes some incontinence.     PAST MEDICAL & SURGICAL HISTORY:  HTN (hypertension)    Gout    Allergies:  No Known Allergies    Home Medications Reviewed  Hospital Medications:   MEDICATIONS  (STANDING):  allopurinol 100 milliGRAM(s) Oral daily  amLODIPine   Tablet 10 milliGRAM(s) Oral daily  atorvastatin 20 milliGRAM(s) Oral at bedtime  labetalol 400 milliGRAM(s) Oral every 8 hours      SOCIAL HISTORY:  Denies ETOH, Smoking,   FAMILY HISTORY:  HTN  REVIEW OF SYSTEMS:  CONSTITUTIONAL: No weakness, fevers or chills  EYES/ENT: No visual changes;  No vertigo or throat pain   NECK: No pain or stiffness  RESPIRATORY: No cough, wheezing, hemoptysis; No shortness of breath  CARDIOVASCULAR: No chest pain or palpitations.  GASTROINTESTINAL: No abdominal or epigastric pain. No nausea, vomiting, or hematemesis; No diarrhea or constipation. No melena or hematochezia.  GENITOURINARY: No dysuria, frequency, foamy urine, urinary urgency. He does note incontinence and hematuria  NEUROLOGICAL: No numbness or weakness  SKIN: No itching, burning, rashes, or lesions   VASCULAR: No bilateral lower extremity edema.   All other review of systems is negative unless indicated above.    VITALS:  T(F): 97.8 (21 @ 09:11), Max: 98.1 (21 @ 04:55)  HR: 92 (21 @ 09:11)  BP: 171/98 (21 @ 09:11)  RR: 18 (21 @ 09:11)  SpO2: 98% (21 @ 07:57)      Weight (kg): 115.7 (08-15 @ 12:05)    I&O's Detail    PHYSICAL EXAM:  Constitutional: NAD  HEENT: anicteric sclera, oropharynx clear, MMM  Neck: No JVD  Respiratory: CTAB, no wheezes, rales or rhonchi  Cardiovascular: S1, S2, RRR  Gastrointestinal: BS+, soft, NT/ND  Extremities: No cyanosis or clubbing. Mild LE edema  Neurological: A/O x 3, no focal deficits  Psychiatric: Normal mood, normal affect  : No CVA tenderness. No verma.   Skin: No rashes  Vascular Access:    LABS:  08-15    142  |  106  |  26<H>  ----------------------------<  96  4.1   |  25  |  2.8<H>    Ca    9.9      15 Aug 2021 13:28    TPro  6.8  /  Alb  4.0  /  TBili  0.2  /  DBili      /  AST  22  /  ALT  19  /  AlkPhos  72  08-15    Creatinine Trend: 2.8 <--                        13.9   10.48 )-----------( 224      ( 16 Aug 2021 08:10 )             43.3     Urine Studies:  Urinalysis Basic - ( 15 Aug 2021 13:28 )    Color: Light Yellow / Appearance: Clear / S.013 / pH:   Gluc:  / Ketone: Negative  / Bili: Negative / Urobili: <2 mg/dL   Blood:  / Protein: 300 mg/dL / Nitrite: Negative   Leuk Esterase: Negative / RBC: 8 /HPF / WBC 2 /HPF   Sq Epi:  / Non Sq Epi: 1 /HPF / Bacteria: Negative      RADIOLOGY & ADDITIONAL STUDIES:  PENDING   NEPHROLOGY CONSULTATION NOTE    45 year old man with hx of gout and HTN, presenting due to 2 week hx of hematuria. Patient reports that 2 weeks ago he started seeing some blood at the beginning of voiding. the hematuria is intermittent, not on a daily basis. with no other symptoms. no dysuria, urgency, frequency, back pain, or any other symptoms. no fever, chills, dyspnea, chest pain, diarrhea, hematochezia, melena, cough, or any other symptoms. patient hematuria has been the same since 2 weeks but not resolving. he drinks alcohol daily. non smoker. no FHx of renal disease. he reports his gout is under control with no flares in the past years. he is not sure why he has HTN and does not know if it is controlled or not    In the ED, his bp was 200/100. labs showed Cr. 2.8, with BUN 26. UA showed proteinuria and small hematuria.    NEPHROLOGY HPI:   Patient states he has had about two weeks of hematuria- most notable at the initiation of voiding. He denies any other overt symptoms (e.g. pain with urination, abdominal/pelvic pain, recent illness etc.). He does state that he stopped taking his anti-hypertensive medications last week (amlodipine + labetalol) because he ran out, but he does not ever recall his BP being this high. Normally, he is compliant with his medications. He does state that he had some routine blood-work taken over one month ago that his PCP (Dr. Ramirez) said showed some evidence of kidney damage, but patient did not follow-up. Patient does not smoke. On ROS he notes some incontinence.     PAST MEDICAL & SURGICAL HISTORY:  HTN (hypertension)    Gout    Allergies:  No Known Allergies    Home Medications Reviewed  Hospital Medications:   MEDICATIONS  (STANDING):  allopurinol 100 milliGRAM(s) Oral daily  amLODIPine   Tablet 10 milliGRAM(s) Oral daily  atorvastatin 20 milliGRAM(s) Oral at bedtime  labetalol 400 milliGRAM(s) Oral every 8 hours      SOCIAL HISTORY:  Denies ETOH, Smoking,   FAMILY HISTORY:  HTN  REVIEW OF SYSTEMS:  CONSTITUTIONAL: No weakness, fevers or chills  EYES/ENT: No visual changes;  No vertigo or throat pain   NECK: No pain or stiffness  RESPIRATORY: No cough, wheezing, hemoptysis; No shortness of breath  CARDIOVASCULAR: No chest pain or palpitations.  GASTROINTESTINAL: No abdominal or epigastric pain. No nausea, vomiting, or hematemesis; No diarrhea or constipation. No melena or hematochezia.  GENITOURINARY: No dysuria, frequency, foamy urine, urinary urgency. He does note incontinence and hematuria  NEUROLOGICAL: No numbness or weakness  SKIN: No itching, burning, rashes, or lesions   VASCULAR: No bilateral lower extremity edema.   All other review of systems is negative unless indicated above.    VITALS:  T(F): 97.8 (21 @ 09:11), Max: 98.1 (21 @ 04:55)  HR: 92 (21 @ 09:11)  BP: 171/98 (21 @ 09:11)  RR: 18 (21 @ 09:11)  SpO2: 98% (21 @ 07:57)      Weight (kg): 115.7 (08-15 @ 12:05)    I&O's Detail    PHYSICAL EXAM:  Constitutional: NAD  HEENT: anicteric sclera, oropharynx clear, MMM  Neck: No JVD  Respiratory: CTAB, no wheezes, rales or rhonchi  Cardiovascular: S1, S2, RRR  Gastrointestinal: BS+, soft, NT/ND  Extremities: No cyanosis or clubbing. Mild LE edema  Neurological: A/O x 3, no focal deficits  Psychiatric: Normal mood, normal affect  : No CVA tenderness. No verma.   Skin: No rashes  Vascular Access:    LABS:  08-15    142  |  106  |  26<H>  ----------------------------<  96  4.1   |  25  |  2.8<H>    Ca    9.9      15 Aug 2021 13:28    TPro  6.8  /  Alb  4.0  /  TBili  0.2  /  DBili      /  AST  22  /  ALT  19  /  AlkPhos  72  08-15    Creatinine Trend: 2.8 <--                        13.9   10.48 )-----------( 224      ( 16 Aug 2021 08:10 )             43.3     Urine Studies:  Urinalysis Basic - ( 15 Aug 2021 13:28 )    Color: Light Yellow / Appearance: Clear / S.013 / pH:   Gluc:  / Ketone: Negative  / Bili: Negative / Urobili: <2 mg/dL   Blood:  / Protein: 300 mg/dL / Nitrite: Negative   Leuk Esterase: Negative / RBC: 8 /HPF / WBC 2 /HPF   Sq Epi:  / Non Sq Epi: 1 /HPF / Bacteria: Negative      RADIOLOGY & ADDITIONAL STUDIES:  < from: US Renal (21 @ 10:47) >  FINDINGS:    Right kidney: 10.1 cm. No hydronephrosis. 1 cm lower pole cyst.    Left kidney:  10.0 cm. No hydronephrosis.    Urinary bladder: Underdistended bladder.Bilateral ureteral jets are visualized.    Prostate: Measures 4 x 3 x 3 cm (19 cc).    IMPRESSION:    No hydronephrosis.    < end of copied text >  < from: CT Abdomen and Pelvis No Cont (08.15.21 @ 17:06) >      IMPRESSION:    No CT evidence for acute intra-abdominal or pelvic pathology.    < end of copied text >

## 2021-08-17 LAB
ALBUMIN SERPL ELPH-MCNC: 3.7 G/DL — SIGNIFICANT CHANGE UP (ref 3.5–5.2)
ALP SERPL-CCNC: 66 U/L — SIGNIFICANT CHANGE UP (ref 30–115)
ALT FLD-CCNC: 15 U/L — SIGNIFICANT CHANGE UP (ref 0–41)
ANION GAP SERPL CALC-SCNC: 12 MMOL/L — SIGNIFICANT CHANGE UP (ref 7–14)
AST SERPL-CCNC: 17 U/L — SIGNIFICANT CHANGE UP (ref 0–41)
AUTO DIFF PNL BLD: NEGATIVE — SIGNIFICANT CHANGE UP
BASOPHILS # BLD AUTO: 0.04 K/UL — SIGNIFICANT CHANGE UP (ref 0–0.2)
BASOPHILS NFR BLD AUTO: 0.5 % — SIGNIFICANT CHANGE UP (ref 0–1)
BILIRUB SERPL-MCNC: 0.4 MG/DL — SIGNIFICANT CHANGE UP (ref 0.2–1.2)
BUN SERPL-MCNC: 31 MG/DL — HIGH (ref 10–20)
C-ANCA SER-ACNC: NEGATIVE — SIGNIFICANT CHANGE UP
C3 SERPL-MCNC: 205 MG/DL — HIGH (ref 81–157)
C4 SERPL-MCNC: 35 MG/DL — SIGNIFICANT CHANGE UP (ref 13–39)
CALCIUM SERPL-MCNC: 9.5 MG/DL — SIGNIFICANT CHANGE UP (ref 8.5–10.1)
CHLORIDE SERPL-SCNC: 104 MMOL/L — SIGNIFICANT CHANGE UP (ref 98–110)
CHOLEST SERPL-MCNC: 208 MG/DL — HIGH
CO2 SERPL-SCNC: 23 MMOL/L — SIGNIFICANT CHANGE UP (ref 17–32)
COVID-19 SPIKE DOMAIN AB INTERP: POSITIVE
COVID-19 SPIKE DOMAIN ANTIBODY RESULT: >250 U/ML — HIGH
CREAT SERPL-MCNC: 3.1 MG/DL — HIGH (ref 0.7–1.5)
CREATININE, URINE RESULT: 113 MG/DL — SIGNIFICANT CHANGE UP
DSDNA AB SER-ACNC: <12 IU/ML — SIGNIFICANT CHANGE UP
EOSINOPHIL # BLD AUTO: 0.19 K/UL — SIGNIFICANT CHANGE UP (ref 0–0.7)
EOSINOPHIL NFR BLD AUTO: 2.5 % — SIGNIFICANT CHANGE UP (ref 0–8)
GLUCOSE SERPL-MCNC: 112 MG/DL — HIGH (ref 70–99)
HAV IGM SER-ACNC: SIGNIFICANT CHANGE UP
HBV CORE IGM SER-ACNC: SIGNIFICANT CHANGE UP
HBV SURFACE AG SER-ACNC: SIGNIFICANT CHANGE UP
HCT VFR BLD CALC: 40.4 % — LOW (ref 42–52)
HCV AB S/CO SERPL IA: 0.18 S/CO — SIGNIFICANT CHANGE UP (ref 0–0.99)
HCV AB SERPL-IMP: SIGNIFICANT CHANGE UP
HDLC SERPL-MCNC: 55 MG/DL — SIGNIFICANT CHANGE UP
HGB BLD-MCNC: 13 G/DL — LOW (ref 14–18)
HIV 1+2 AB+HIV1 P24 AG SERPL QL IA: SIGNIFICANT CHANGE UP
IMM GRANULOCYTES NFR BLD AUTO: 0.4 % — HIGH (ref 0.1–0.3)
KAPPA LC SER QL IFE: 2.68 MG/DL — HIGH (ref 0.33–1.94)
KAPPA/LAMBDA FREE LIGHT CHAIN RATIO, SERUM: 0.72 RATIO — SIGNIFICANT CHANGE UP (ref 0.26–1.65)
LAMBDA LC SER QL IFE: 3.74 MG/DL — HIGH (ref 0.57–2.63)
LIPID PNL WITH DIRECT LDL SERPL: 100 MG/DL — HIGH
LYMPHOCYTES # BLD AUTO: 2.76 K/UL — SIGNIFICANT CHANGE UP (ref 1.2–3.4)
LYMPHOCYTES # BLD AUTO: 35.9 % — SIGNIFICANT CHANGE UP (ref 20.5–51.1)
MCHC RBC-ENTMCNC: 24.1 PG — LOW (ref 27–31)
MCHC RBC-ENTMCNC: 32.2 G/DL — SIGNIFICANT CHANGE UP (ref 32–37)
MCV RBC AUTO: 74.8 FL — LOW (ref 80–94)
MONOCYTES # BLD AUTO: 0.57 K/UL — SIGNIFICANT CHANGE UP (ref 0.1–0.6)
MONOCYTES NFR BLD AUTO: 7.4 % — SIGNIFICANT CHANGE UP (ref 1.7–9.3)
MPO AB + PR3 PNL SER: SIGNIFICANT CHANGE UP
NEUTROPHILS # BLD AUTO: 4.09 K/UL — SIGNIFICANT CHANGE UP (ref 1.4–6.5)
NEUTROPHILS NFR BLD AUTO: 53.3 % — SIGNIFICANT CHANGE UP (ref 42.2–75.2)
NON HDL CHOLESTEROL: 153 MG/DL — HIGH
NRBC # BLD: 0 /100 WBCS — SIGNIFICANT CHANGE UP (ref 0–0)
P-ANCA SER-ACNC: NEGATIVE — SIGNIFICANT CHANGE UP
PLATELET # BLD AUTO: 202 K/UL — SIGNIFICANT CHANGE UP (ref 130–400)
POTASSIUM SERPL-MCNC: 3.6 MMOL/L — SIGNIFICANT CHANGE UP (ref 3.5–5)
POTASSIUM SERPL-SCNC: 3.6 MMOL/L — SIGNIFICANT CHANGE UP (ref 3.5–5)
PROT ?TM UR-MCNC: 1009 MG/DL — HIGH (ref 0–12)
PROT SERPL-MCNC: 6.3 G/DL — SIGNIFICANT CHANGE UP (ref 6–8)
RBC # BLD: 5.4 M/UL — SIGNIFICANT CHANGE UP (ref 4.7–6.1)
RBC # FLD: 16.3 % — HIGH (ref 11.5–14.5)
SARS-COV-2 IGG+IGM SERPL QL IA: >250 U/ML — HIGH
SARS-COV-2 IGG+IGM SERPL QL IA: POSITIVE
SODIUM SERPL-SCNC: 139 MMOL/L — SIGNIFICANT CHANGE UP (ref 135–146)
TRIGL SERPL-MCNC: 231 MG/DL — HIGH
WBC # BLD: 7.68 K/UL — SIGNIFICANT CHANGE UP (ref 4.8–10.8)
WBC # FLD AUTO: 7.68 K/UL — SIGNIFICANT CHANGE UP (ref 4.8–10.8)

## 2021-08-17 RX ORDER — LABETALOL HCL 100 MG
200 TABLET ORAL ONCE
Refills: 0 | Status: COMPLETED | OUTPATIENT
Start: 2021-08-17 | End: 2021-08-17

## 2021-08-17 RX ORDER — HYDRALAZINE HCL 50 MG
25 TABLET ORAL EVERY 8 HOURS
Refills: 0 | Status: DISCONTINUED | OUTPATIENT
Start: 2021-08-17 | End: 2021-08-19

## 2021-08-17 RX ADMIN — Medication 200 MILLIGRAM(S): at 14:52

## 2021-08-17 RX ADMIN — AMLODIPINE BESYLATE 10 MILLIGRAM(S): 2.5 TABLET ORAL at 06:57

## 2021-08-17 RX ADMIN — Medication 400 MILLIGRAM(S): at 21:46

## 2021-08-17 RX ADMIN — Medication 400 MILLIGRAM(S): at 06:57

## 2021-08-17 RX ADMIN — ATORVASTATIN CALCIUM 20 MILLIGRAM(S): 80 TABLET, FILM COATED ORAL at 21:46

## 2021-08-17 RX ADMIN — Medication 100 MILLIGRAM(S): at 11:59

## 2021-08-17 RX ADMIN — Medication 25 MILLIGRAM(S): at 21:46

## 2021-08-17 NOTE — PROGRESS NOTE ADULT - SUBJECTIVE AND OBJECTIVE BOX
Patient was seen and examined.  Chart reviewed.  No events overnight. Ambulating around unit; no complaints, no gross hematuria; in excellent spirits  Vital Signs Last 24 Hrs  T(F): 97.3 (17 Aug 2021 05:23), Max: 97.8 (16 Aug 2021 09:11)  HR: 74 (17 Aug 2021 08:03) (74 - 92)  BP: 141/80 (17 Aug 2021 08:03) (141/80 - 185/91)  SpO2: --  MEDICATIONS  (STANDING):  allopurinol 100 milliGRAM(s) Oral daily  amLODIPine   Tablet 10 milliGRAM(s) Oral daily  atorvastatin 20 milliGRAM(s) Oral at bedtime  hydrALAZINE 25 milliGRAM(s) Oral every 8 hours  labetalol 400 milliGRAM(s) Oral every 8 hours    MEDICATIONS  (PRN):  acetaminophen   Tablet .. 650 milliGRAM(s) Oral every 8 hours PRN Temp greater or equal to 38C (100.4F), Mild Pain (1 - 3)    Labs:                        13.9   10.48 )-----------( 224      ( 16 Aug 2021 08:10 )             43.3                         13.2   6.68  )-----------( 207      ( 15 Aug 2021 13:28 )             41.1     16 Aug 2021 08:10    140    |  104    |  26     ----------------------------<  102    4.0     |  24     |  2.6    15 Aug 2021 13:28    142    |  106    |  26     ----------------------------<  96     4.1     |  25     |  2.8      Ca    9.8        16 Aug 2021 08:10  Ca    9.9        15 Aug 2021 13:28  Mg     1.8       16 Aug 2021 08:10    TPro  6.8    /  Alb  3.9    /  TBili  0.6    /  DBili  x      /  AST  22     /  ALT  19     /  AlkPhos  71     16 Aug 2021 08:10  TPro  6.8    /  Alb  4.0    /  TBili  0.2    /  DBili  x      /  AST  22     /  ALT  19     /  AlkPhos  72     15 Aug 2021 13:28    PT/INR - ( 15 Aug 2021 13:28 )   PT: 10.40 sec;   INR: 0.90 ratio         PTT - ( 15 Aug 2021 13:28 )  PTT:34.0 sec  Urinalysis Basic - ( 15 Aug 2021 13:28 )    Color: Light Yellow / Appearance: Clear / S.013 / pH: x  Gluc: x / Ketone: Negative  / Bili: Negative / Urobili: <2 mg/dL   Blood: x / Protein: 300 mg/dL / Nitrite: Negative   Leuk Esterase: Negative / RBC: 8 /HPF / WBC 2 /HPF   Sq Epi: x / Non Sq Epi: 1 /HPF / Bacteria: Negative        Culture - Urine (collected 15 Aug 2021 13:28)  Source: Clean Catch Clean Catch (Midstream)  Final Report (16 Aug 2021 16:45):    No growth      General: comfortable, NAD  Neurology: A&Ox3, nonfocal  exam unchanged    A/P:  45 year old man with hx of gout and HTN, presenting due to 2 week hx of hematuria noted with FORREST and HTN- admitted to r/o nephritic syndrome causes vs obstructive causes    BP control as per renal    Cr stable     24 hr urine collection    possible renal biopsy    plan as per renal - to decide on inpt vs outpt workup    OOB, ambulate     DVT prophylaxis  Decubitus prevention- all measures as per RN protocol  Please call or text me with any questions or updates

## 2021-08-17 NOTE — PROGRESS NOTE ADULT - SUBJECTIVE AND OBJECTIVE BOX
seen and examined   no new complaints         PAST HISTORY  --------------------------------------------------------------------------------  No significant changes to PMH, PSH, FHx, SHx, unless otherwise noted    ALLERGIES & MEDICATIONS  --------------------------------------------------------------------------------  Allergies    No Known Allergies    Intolerances      Standing Inpatient Medications  allopurinol 100 milliGRAM(s) Oral daily  amLODIPine   Tablet 10 milliGRAM(s) Oral daily  atorvastatin 20 milliGRAM(s) Oral at bedtime  labetalol 400 milliGRAM(s) Oral every 8 hours    PRN Inpatient Medications  acetaminophen   Tablet .. 650 milliGRAM(s) Oral every 8 hours PRN            VITALS/PHYSICAL EXAM  --------------------------------------------------------------------------------  T(C): 36.3 (08-17-21 @ 05:23), Max: 36.6 (08-16-21 @ 09:11)  HR: 80 (08-17-21 @ 05:23) (80 - 92)  BP: 170/101 (08-17-21 @ 05:23) (146/85 - 185/91)  RR: 18 (08-17-21 @ 05:23) (18 - 18)  SpO2: 98% (08-16-21 @ 07:57) (98% - 98%)  Wt(kg): --    Weight (kg): 115.7 (08-15-21 @ 12:05)          LABS/STUDIES  --------------------------------------------------------------------------------              13.9   10.48 >-----------<  224      [08-16-21 @ 08:10]              43.3     140  |  104  |  26  ----------------------------<  102      [08-16-21 @ 08:10]  4.0   |  24  |  2.6        Ca     9.8     [08-16-21 @ 08:10]      Mg     1.8     [08-16-21 @ 08:10]    TPro  6.8  /  Alb  3.9  /  TBili  0.6  /  DBili  x   /  AST  22  /  ALT  19  /  AlkPhos  71  [08-16-21 @ 08:10]    PT/INR: PT 10.40, INR 0.90       [08-15-21 @ 13:28]  PTT: 34.0       [08-15-21 @ 13:28]    Uric acid 8.2      [08-16-21 @ 08:10]    Creatinine Trend:  SCr 2.6 [08-16 @ 08:10]  SCr 2.8 [08-15 @ 13:28]    Urinalysis - [08-15-21 @ 13:28]      Color Light Yellow / Appearance Clear / SG 1.013 / pH 7.5      Gluc Trace / Ketone Negative  / Bili Negative / Urobili <2 mg/dL       Blood Small / Protein 300 mg/dL / Leuk Est Negative / Nitrite Negative      RBC 8 / WBC 2 / Hyaline 2 / Gran  / Sq Epi  / Non Sq Epi 1 / Bacteria Negative    Urine Creatinine 107      [08-16-21 @ 13:58]  Urine Protein 1009      [08-16-21 @ 14:36]      HIV Nonreact      [08-16-21 @ 08:10]

## 2021-08-17 NOTE — PROGRESS NOTE ADULT - SUBJECTIVE AND OBJECTIVE BOX
JEOVANY HANSON 45y Male  MRN#: [id number]      Pt is currently admitted with the primary diagnosis of    SUBJECTIVE  Hospital Day: 2d  Hospital Course:     Overnight events : None.    Subjective complaints: None. Denies headaches, change in vision, or hematuria.     Present Today:   - Rodriguez:  No [  ], Yes [   ] : Indication:     - Type of IV Access:       .. CVC/Piccline:  No [  ], Yes [   ] : Indication:       .. Midline: No [  ], Yes [   ] : Indication:                                             ----------------------------------------------------------  OBJECTIVE        PAST MEDICAL & SURGICAL HISTORY  HTN (hypertension)    Gout                                              -----------------------------------------------------------  ALLERGIES:  No Known Allergies                                            ------------------------------------------------------------    HOME MEDICATIONS  Home Medications:  allopurinol 100 mg oral tablet: 1 tab(s) orally once a day (15 Aug 2021 16:15)  amlodipine-benazepril 10 mg-40 mg oral capsule: 1 cap(s) orally once a day (15 Aug 2021 16:15)  atorvastatin 20 mg oral tablet: 1 tab(s) orally once a day (15 Aug 2021 16:15)  chlorthalidone 25 mg oral tablet: 1 tab(s) orally once a day (15 Aug 2021 16:15)                           MEDICATIONS:  STANDING MEDICATIONS  allopurinol 100 milliGRAM(s) Oral daily  amLODIPine   Tablet 10 milliGRAM(s) Oral daily  atorvastatin 20 milliGRAM(s) Oral at bedtime  hydrALAZINE 25 milliGRAM(s) Oral every 8 hours  labetalol 400 milliGRAM(s) Oral every 8 hours    PRN MEDICATIONS  acetaminophen   Tablet .. 650 milliGRAM(s) Oral every 8 hours PRN                                            ------------------------------------------------------------  VITAL SIGNS: Last 24 Hours  T(C): 36.3 (17 Aug 2021 05:23), Max: 36.6 (16 Aug 2021 09:11)  T(F): 97.3 (17 Aug 2021 05:23), Max: 97.8 (16 Aug 2021 09:11)  HR: 74 (17 Aug 2021 08:03) (74 - 92)  BP: 141/80 (17 Aug 2021 08:03) (141/80 - 185/91)  BP(mean): --  RR: 18 (17 Aug 2021 05:23) (18 - 18)  SpO2: --                                             --------------------------------------------------------------  LABS:                        13.0   7.68  )-----------( 202      ( 17 Aug 2021 07:47 )             40.4     08-16    140  |  104  |  26<H>  ----------------------------<  102<H>  4.0   |  24  |  2.6<H>    Ca    9.8      16 Aug 2021 08:10  Mg     1.8     08-16    TPro  6.8  /  Alb  3.9  /  TBili  0.6  /  DBili  x   /  AST  22  /  ALT  19  /  AlkPhos  71  08-16    PT/INR - ( 15 Aug 2021 13:28 )   PT: 10.40 sec;   INR: 0.90 ratio         PTT - ( 15 Aug 2021 13:28 )  PTT:34.0 sec  Urinalysis Basic - ( 15 Aug 2021 13:28 )    Color: Light Yellow / Appearance: Clear / S.013 / pH: x  Gluc: x / Ketone: Negative  / Bili: Negative / Urobili: <2 mg/dL   Blood: x / Protein: 300 mg/dL / Nitrite: Negative   Leuk Esterase: Negative / RBC: 8 /HPF / WBC 2 /HPF   Sq Epi: x / Non Sq Epi: 1 /HPF / Bacteria: Negative              Culture - Urine (collected 15 Aug 2021 13:28)  Source: Clean Catch Clean Catch (Midstream)  Final Report (16 Aug 2021 16:45):    No growth                                                    -------------------------------------------------------------  RADIOLOGY:                                            --------------------------------------------------------------    PHYSICAL EXAM:  General:   HEENT:  LUNGS:  HEART:  ABDOMEN:  EXT:  NEURO:  SKIN:                                           --------------------------------------------------------------    ASSESSMENT & PLAN    Past medical history and hospital course                           JEOVANY HANSON 45y Male  MRN#: [id number]      Pt is currently admitted with the primary diagnosis of hematuria.    SUBJECTIVE  Hospital Day: 2d  Hospital Course: 45 year old man with hx of gout and HTN, presenting due to 2 week hx of hematuria. Patient reports that 2 weeks ago he started seeing some blood at the beginning of voiding. the hematuria is intermittent, not on a daily basis. with no other symptoms. no dysuria, urgency, frequency, back pain, or any other symptoms. no fever, chills, dyspnea, chest pain, diarrhea, hematochezia, melena, cough, or any other symptoms. patient hematuria has been the same since 2 weeks but not resolving. he drinks alcohol daily. non smoker. no FHx of renal disease. he reports his gout is under control with no flares in the past years. He states his blood pressure normally runs high.  In the ED, his bp was 200/100. labs showed Cr. 2.8, with BUN 26. UA showed proteinuria and small hematuria.    Patient is pending urology c/s. CT abdomen/pelvis noncontrast was negative for any acute intraabdominal pathology. US Renal and bladder was negative. [facility] duplex showed no stenosis. Nephrology recommended full w/u of possible nephritic syndrome causes. In the event of inconclusive results, nephrology recommends possible kidney biospy. Urine protein/cr ratio is elevated at 0.7 and urine protein is elevated 1009. HIV testing is negative. No growth to date on urine cx.    Overnight events : None.    Subjective complaints: None. Denies headaches, change in vision, or hematuria.                                             ----------------------------------------------------------  OBJECTIVE        PAST MEDICAL & SURGICAL HISTORY  HTN (hypertension)    Gout                                              -----------------------------------------------------------  ALLERGIES:  No Known Allergies                                            ------------------------------------------------------------    HOME MEDICATIONS  Home Medications:  allopurinol 100 mg oral tablet: 1 tab(s) orally once a day (15 Aug 2021 16:15)  amlodipine-benazepril 10 mg-40 mg oral capsule: 1 cap(s) orally once a day (15 Aug 2021 16:15)  atorvastatin 20 mg oral tablet: 1 tab(s) orally once a day (15 Aug 2021 16:15)  chlorthalidone 25 mg oral tablet: 1 tab(s) orally once a day (15 Aug 2021 16:15)                           MEDICATIONS:  STANDING MEDICATIONS  allopurinol 100 milliGRAM(s) Oral daily  amLODIPine   Tablet 10 milliGRAM(s) Oral daily  atorvastatin 20 milliGRAM(s) Oral at bedtime  hydrALAZINE 25 milliGRAM(s) Oral every 8 hours  labetalol 400 milliGRAM(s) Oral every 8 hours    PRN MEDICATIONS  acetaminophen   Tablet .. 650 milliGRAM(s) Oral every 8 hours PRN                                            ------------------------------------------------------------  VITAL SIGNS: Last 24 Hours  T(C): 36.3 (17 Aug 2021 05:23), Max: 36.6 (16 Aug 2021 09:11)  T(F): 97.3 (17 Aug 2021 05:23), Max: 97.8 (16 Aug 2021 09:11)  HR: 74 (17 Aug 2021 08:03) (74 - 92)  BP: 141/80 (17 Aug 2021 08:03) (141/80 - 185/91)  BP(mean): --  RR: 18 (17 Aug 2021 05:23) (18 - 18)  SpO2: --                                             --------------------------------------------------------------  LABS:                        13.0   7.68  )-----------( 202      ( 17 Aug 2021 07:47 )             40.4         140  |  104  |  26<H>  ----------------------------<  102<H>  4.0   |  24  |  2.6<H>    Ca    9.8      16 Aug 2021 08:10  Mg     1.8         TPro  6.8  /  Alb  3.9  /  TBili  0.6  /  DBili  x   /  AST  22  /  ALT  19  /  AlkPhos  71      PT/INR - ( 15 Aug 2021 13:28 )   PT: 10.40 sec;   INR: 0.90 ratio         PTT - ( 15 Aug 2021 13:28 )  PTT:34.0 sec  Urinalysis Basic - ( 15 Aug 2021 13:28 )    Color: Light Yellow / Appearance: Clear / S.013 / pH: x  Gluc: x / Ketone: Negative  / Bili: Negative / Urobili: <2 mg/dL   Blood: x / Protein: 300 mg/dL / Nitrite: Negative   Leuk Esterase: Negative / RBC: 8 /HPF / WBC 2 /HPF   Sq Epi: x / Non Sq Epi: 1 /HPF / Bacteria: Negative              Culture - Urine (collected 15 Aug 2021 13:28)  Source: Clean Catch Clean Catch (Midstream)  Final Report (16 Aug 2021 16:45):    No growth                                                    -------------------------------------------------------------  RADIOLOGY: CT A/P (08/15): No CT evidence for acute intra-abdominal or pelvic pathology.    Renal U/S (): No hydronephrosis.    [facility] duplex (): No evidence of significant hemodynamic stenosis noted in bilateral renal arteries.                                            --------------------------------------------------------------    PHYSICAL EXAM:  General: well-appearing, NAD  LUNGS: clear lung sounds b/l  HEART: +S1,S2, RRR  ABDOMEN: soft, NTND, BS+ 4 quads  EXT: no edema   NEURO: AAOx4  SKIN: warm, well-perfused, no abrasions.                                           --------------------------------------------------------------    ASSESSMENT & PLAN    Past medical history and hospital course    45 year old man with hx of gout and HTN, presenting due to 2 week hx of hematuria    # FORREST and Hematuria -- r/o nephritic syndrome causes vs obstructive causes  - UA showing hematuria and proteinuria  - patient with uncontrolled HTN and mild edema  - Hb stable  - send urine lytes  - Urine protein/Creatinine ratio  - CT abdomen/pelvis noncontrast (8/15/2021):  No CT evidence for acute intra-abdominal or pelvic pathology.  - US Renal and bladder (8/15/21): no hydronephrosis  - Uro consult   - Nephro consult: Serum C3 and C4 complement levels  ANCA; using enzyme-linked immunosorbent assays [ELISAs] specific for proteinase-3 and myeloperoxidase)  Anti GBM ABs   Antinuclear antibodies  Anti-dsDNA antibodies  Serology for hepatitis C virus, hepatitis B virus, and HIV - HIV NEGATIVE  Serum free light chains and serum immunofixation      # Uncontrolled HTN  - c/w amlodipine 10mg OD (might swtich to nifedipine if BP not controlled)   - increase dose of lebatalol to 400mg Q8H   - holding Benazapril due to FORREST  - holding Chlorthalidone for now (patient reports he doesn't take it but pharmacy said he does)  - if high BP consider giving hydralazine     # Gout  - continue home allopurinol  - check uric acid level    # DVT prophylaxis: SCD  # GI proph: not needed  # Diet: DASH  # Dispo: acute JEOVANY HANSON 45y Male  MRN#: [id number]      Pt is currently admitted with the primary diagnosis of hematuria.    SUBJECTIVE  Hospital Day: 2d  Hospital Course: 45 year old man with hx of gout and HTN, presenting due to 2 week hx of hematuria. Patient reports that 2 weeks ago he started seeing some blood at the beginning of voiding. the hematuria is intermittent, not on a daily basis. with no other symptoms. no dysuria, urgency, frequency, back pain, or any other symptoms. no fever, chills, dyspnea, chest pain, diarrhea, hematochezia, melena, cough, or any other symptoms. patient hematuria has been the same since 2 weeks but not resolving. he drinks alcohol daily. non smoker. no FHx of renal disease. he reports his gout is under control with no flares in the past years. He states his blood pressure normally runs high.  In the ED, his bp was 200/100. labs showed Cr. 2.8, with BUN 26. UA showed proteinuria and small hematuria.    Patient is pending urology c/s. CT abdomen/pelvis noncontrast was negative for any acute intraabdominal pathology. US Renal and bladder was negative. [facility] renal duplex showed no stenosis. Nephrology recommended full w/u of possible nephritic syndrome causes. In the event of inconclusive results, nephrology recommends possible kidney biospy. Urine protein/cr ratio is elevated at 0.7 and urine protein is elevated 1009. HIV testing is negative. No growth to date on urine cx.    Overnight events : None.    Subjective complaints: None. Denies headaches, change in vision, or hematuria.                                             ----------------------------------------------------------  OBJECTIVE        PAST MEDICAL & SURGICAL HISTORY  HTN (hypertension)    Gout                                              -----------------------------------------------------------  ALLERGIES:  No Known Allergies                                            ------------------------------------------------------------    HOME MEDICATIONS  Home Medications:  allopurinol 100 mg oral tablet: 1 tab(s) orally once a day (15 Aug 2021 16:15)  amlodipine-benazepril 10 mg-40 mg oral capsule: 1 cap(s) orally once a day (15 Aug 2021 16:15)  atorvastatin 20 mg oral tablet: 1 tab(s) orally once a day (15 Aug 2021 16:15)  chlorthalidone 25 mg oral tablet: 1 tab(s) orally once a day (15 Aug 2021 16:15)                           MEDICATIONS:  STANDING MEDICATIONS  allopurinol 100 milliGRAM(s) Oral daily  amLODIPine   Tablet 10 milliGRAM(s) Oral daily  atorvastatin 20 milliGRAM(s) Oral at bedtime  hydrALAZINE 25 milliGRAM(s) Oral every 8 hours  labetalol 400 milliGRAM(s) Oral every 8 hours    PRN MEDICATIONS  acetaminophen   Tablet .. 650 milliGRAM(s) Oral every 8 hours PRN                                            ------------------------------------------------------------  VITAL SIGNS: Last 24 Hours  T(C): 36.3 (17 Aug 2021 05:23), Max: 36.6 (16 Aug 2021 09:11)  T(F): 97.3 (17 Aug 2021 05:23), Max: 97.8 (16 Aug 2021 09:11)  HR: 74 (17 Aug 2021 08:03) (74 - 92)  BP: 141/80 (17 Aug 2021 08:03) (141/80 - 185/91)  BP(mean): --  RR: 18 (17 Aug 2021 05:23) (18 - 18)  SpO2: --                                             --------------------------------------------------------------  LABS:                        13.0   7.68  )-----------( 202      ( 17 Aug 2021 07:47 )             40.4         140  |  104  |  26<H>  ----------------------------<  102<H>  4.0   |  24  |  2.6<H>    Ca    9.8      16 Aug 2021 08:10  Mg     1.8         TPro  6.8  /  Alb  3.9  /  TBili  0.6  /  DBili  x   /  AST  22  /  ALT  19  /  AlkPhos  71      PT/INR - ( 15 Aug 2021 13:28 )   PT: 10.40 sec;   INR: 0.90 ratio         PTT - ( 15 Aug 2021 13:28 )  PTT:34.0 sec  Urinalysis Basic - ( 15 Aug 2021 13:28 )    Color: Light Yellow / Appearance: Clear / S.013 / pH: x  Gluc: x / Ketone: Negative  / Bili: Negative / Urobili: <2 mg/dL   Blood: x / Protein: 300 mg/dL / Nitrite: Negative   Leuk Esterase: Negative / RBC: 8 /HPF / WBC 2 /HPF   Sq Epi: x / Non Sq Epi: 1 /HPF / Bacteria: Negative              Culture - Urine (collected 15 Aug 2021 13:28)  Source: Clean Catch Clean Catch (Midstream)  Final Report (16 Aug 2021 16:45):    No growth                                                    -------------------------------------------------------------  RADIOLOGY: CT A/P (08/15): No CT evidence for acute intra-abdominal or pelvic pathology.    Renal U/S (): No hydronephrosis.    [facility] duplex (): No evidence of significant hemodynamic stenosis noted in bilateral renal arteries.                                            --------------------------------------------------------------    PHYSICAL EXAM:  General: well-appearing, NAD  LUNGS: clear lung sounds b/l  HEART: +S1,S2, RRR  ABDOMEN: soft, NTND, BS+ 4 quads  EXT: no edema   NEURO: AAOx4  SKIN: warm, well-perfused, no abrasions.                                           --------------------------------------------------------------    ASSESSMENT & PLAN    Past medical history and hospital course    45 year old man with hx of gout and HTN, presenting due to 2 week hx of hematuria    # FORREST and Hematuria -- r/o nephritic syndrome causes vs obstructive causes  - UA showing hematuria and proteinuria  - patient with uncontrolled HTN and mild edema  - Hb stable  - send urine lytes  - Urine protein/Creatinine ratio  - CT abdomen/pelvis noncontrast (8/15/2021):  No CT evidence for acute intra-abdominal or pelvic pathology.  - US Renal and bladder (8/15/21): no hydronephrosis  - Uro consult   - Nephro consult: Serum C3 and C4 complement levels  ANCA; using enzyme-linked immunosorbent assays [ELISAs] specific for proteinase-3 and myeloperoxidase)  Anti GBM ABs   Antinuclear antibodies  Anti-dsDNA antibodies  Serology for hepatitis C virus, hepatitis B virus, and HIV - HIV NEGATIVE  Serum free light chains and serum immunofixation  24 hour urine collection      # Uncontrolled HTN  - c/w amlodipine 10mg OD (might swtich to nifedipine if BP not controlled)   - increase dose of lebatalol to 400mg Q8H   - holding Benazapril due to FORREST  - holding Chlorthalidone for now (patient reports he doesn't take it but pharmacy said he does)  - if high BP consider giving hydralazine     # Gout  - continue home allopurinol  - check uric acid level    # DVT prophylaxis: SCD  # GI proph: not needed  # Diet: DASH  # Dispo: acute

## 2021-08-18 LAB
ALBUMIN SERPL ELPH-MCNC: 3.6 G/DL — SIGNIFICANT CHANGE UP (ref 3.5–5.2)
ALP SERPL-CCNC: 66 U/L — SIGNIFICANT CHANGE UP (ref 30–115)
ALT FLD-CCNC: 17 U/L — SIGNIFICANT CHANGE UP (ref 0–41)
ANA PAT FLD IF-IMP: ABNORMAL
ANA TITR SER: ABNORMAL
ANION GAP SERPL CALC-SCNC: 12 MMOL/L — SIGNIFICANT CHANGE UP (ref 7–14)
AST SERPL-CCNC: 19 U/L — SIGNIFICANT CHANGE UP (ref 0–41)
BASOPHILS # BLD AUTO: 0.04 K/UL — SIGNIFICANT CHANGE UP (ref 0–0.2)
BASOPHILS NFR BLD AUTO: 0.5 % — SIGNIFICANT CHANGE UP (ref 0–1)
BILIRUB SERPL-MCNC: <0.2 MG/DL — SIGNIFICANT CHANGE UP (ref 0.2–1.2)
BUN SERPL-MCNC: 29 MG/DL — HIGH (ref 10–20)
CALCIUM SERPL-MCNC: 8.8 MG/DL — SIGNIFICANT CHANGE UP (ref 8.5–10.1)
CHLORIDE SERPL-SCNC: 104 MMOL/L — SIGNIFICANT CHANGE UP (ref 98–110)
CO2 SERPL-SCNC: 22 MMOL/L — SIGNIFICANT CHANGE UP (ref 17–32)
COLLECT DURATION TIME UR: 24 HR — SIGNIFICANT CHANGE UP
CREAT SERPL-MCNC: 2.8 MG/DL — HIGH (ref 0.7–1.5)
EOSINOPHIL # BLD AUTO: 0.22 K/UL — SIGNIFICANT CHANGE UP (ref 0–0.7)
EOSINOPHIL NFR BLD AUTO: 2.7 % — SIGNIFICANT CHANGE UP (ref 0–8)
GLUCOSE SERPL-MCNC: 102 MG/DL — HIGH (ref 70–99)
HCT VFR BLD CALC: 38.7 % — LOW (ref 42–52)
HGB BLD-MCNC: 12.2 G/DL — LOW (ref 14–18)
IMM GRANULOCYTES NFR BLD AUTO: 0.2 % — SIGNIFICANT CHANGE UP (ref 0.1–0.3)
LYMPHOCYTES # BLD AUTO: 2.9 K/UL — SIGNIFICANT CHANGE UP (ref 1.2–3.4)
LYMPHOCYTES # BLD AUTO: 36.1 % — SIGNIFICANT CHANGE UP (ref 20.5–51.1)
MAGNESIUM SERPL-MCNC: 1.7 MG/DL — LOW (ref 1.8–2.4)
MCHC RBC-ENTMCNC: 23.8 PG — LOW (ref 27–31)
MCHC RBC-ENTMCNC: 31.5 G/DL — LOW (ref 32–37)
MCV RBC AUTO: 75.4 FL — LOW (ref 80–94)
MONOCYTES # BLD AUTO: 0.54 K/UL — SIGNIFICANT CHANGE UP (ref 0.1–0.6)
MONOCYTES NFR BLD AUTO: 6.7 % — SIGNIFICANT CHANGE UP (ref 1.7–9.3)
NEUTROPHILS # BLD AUTO: 4.31 K/UL — SIGNIFICANT CHANGE UP (ref 1.4–6.5)
NEUTROPHILS NFR BLD AUTO: 53.8 % — SIGNIFICANT CHANGE UP (ref 42.2–75.2)
NRBC # BLD: 0 /100 WBCS — SIGNIFICANT CHANGE UP (ref 0–0)
PHOSPHATE SERPL-MCNC: 4 MG/DL — SIGNIFICANT CHANGE UP (ref 2.1–4.9)
PLATELET # BLD AUTO: 194 K/UL — SIGNIFICANT CHANGE UP (ref 130–400)
POTASSIUM SERPL-MCNC: 3.6 MMOL/L — SIGNIFICANT CHANGE UP (ref 3.5–5)
POTASSIUM SERPL-SCNC: 3.6 MMOL/L — SIGNIFICANT CHANGE UP (ref 3.5–5)
PROT 24H UR-MRATE: >4089 MG/24 H — HIGH (ref 50–100)
PROT SERPL-MCNC: 5.9 G/DL — LOW (ref 6–8)
RBC # BLD: 5.13 M/UL — SIGNIFICANT CHANGE UP (ref 4.7–6.1)
RBC # FLD: 16.2 % — HIGH (ref 11.5–14.5)
SODIUM SERPL-SCNC: 138 MMOL/L — SIGNIFICANT CHANGE UP (ref 135–146)
TOTAL VOLUME - 24 HOUR: 2175 ML — SIGNIFICANT CHANGE UP
URINE CREATININE CALCULATION: 2.3 G/24 HR — HIGH (ref 1–2)
URINE CREATININE CALCULATION: 2.3 G/24 HR — HIGH (ref 1–2)
WBC # BLD: 8.03 K/UL — SIGNIFICANT CHANGE UP (ref 4.8–10.8)
WBC # FLD AUTO: 8.03 K/UL — SIGNIFICANT CHANGE UP (ref 4.8–10.8)

## 2021-08-18 RX ORDER — MAGNESIUM SULFATE 500 MG/ML
2 VIAL (ML) INJECTION ONCE
Refills: 0 | Status: COMPLETED | OUTPATIENT
Start: 2021-08-18 | End: 2021-08-18

## 2021-08-18 RX ADMIN — Medication 400 MILLIGRAM(S): at 13:53

## 2021-08-18 RX ADMIN — Medication 25 MILLIGRAM(S): at 05:40

## 2021-08-18 RX ADMIN — Medication 400 MILLIGRAM(S): at 05:40

## 2021-08-18 RX ADMIN — Medication 50 GRAM(S): at 14:56

## 2021-08-18 RX ADMIN — Medication 100 MILLIGRAM(S): at 11:12

## 2021-08-18 RX ADMIN — Medication 25 MILLIGRAM(S): at 13:53

## 2021-08-18 RX ADMIN — AMLODIPINE BESYLATE 10 MILLIGRAM(S): 2.5 TABLET ORAL at 05:40

## 2021-08-18 RX ADMIN — Medication 25 MILLIGRAM(S): at 21:47

## 2021-08-18 RX ADMIN — Medication 400 MILLIGRAM(S): at 21:47

## 2021-08-18 RX ADMIN — ATORVASTATIN CALCIUM 20 MILLIGRAM(S): 80 TABLET, FILM COATED ORAL at 21:47

## 2021-08-18 NOTE — PROGRESS NOTE ADULT - SUBJECTIVE AND OBJECTIVE BOX
JEOVANY HANSON 45y Male  MRN#: [id number]      Pt is currently admitted with the primary diagnosis of hematuria/proteinuria.    SUBJECTIVE  Hospital Day: 3d  Hospital Course: 45 year old man with hx of gout and HTN, presenting due to 2 week hx of hematuria. Patient reports that 2 weeks ago he started seeing some blood at the beginning of voiding. the hematuria is intermittent, not on a daily basis. with no other symptoms. no dysuria, urgency, frequency, back pain, or any other symptoms. no fever, chills, dyspnea, chest pain, diarrhea, hematochezia, melena, cough, or any other symptoms. patient hematuria has been the same since 2 weeks but not resolving. he drinks alcohol daily. non smoker. no FHx of renal disease. he reports his gout is under control with no flares in the past years. He states his blood pressure normally runs high.  In the ED, his bp was 200/100. labs showed Cr. 2.8, with BUN 26. UA showed proteinuria and small hematuria.    Patient is pending urology c/s. CT abdomen/pelvis noncontrast was negative for any acute intraabdominal pathology. US Renal and bladder was negative. [facility] duplex showed no stenosis. Nephrology recommended full w/u of possible nephritic syndrome causes - ANCA, Hep panel, DsDNA Ab are all NEGATIVE. Serum light chain ratio is negative. Urine protein/cr ratio is elevated at 0.7 and urine protein is elevated 1009. HIV testing is negative. No growth to date on urine cx. Patient pending 24 hour urine collection. Patient may need possible kidney bx if results inconclusive.    Overnight events : None.    Subjective complaints: None. Denies hematuria and headaches.                                             ----------------------------------------------------------  OBJECTIVE        PAST MEDICAL & SURGICAL HISTORY  HTN (hypertension)    Gout                                              -----------------------------------------------------------  ALLERGIES:  No Known Allergies                                            ------------------------------------------------------------    HOME MEDICATIONS  Home Medications:  allopurinol 100 mg oral tablet: 1 tab(s) orally once a day (15 Aug 2021 16:15)  amlodipine-benazepril 10 mg-40 mg oral capsule: 1 cap(s) orally once a day (15 Aug 2021 16:15)  atorvastatin 20 mg oral tablet: 1 tab(s) orally once a day (15 Aug 2021 16:15)  chlorthalidone 25 mg oral tablet: 1 tab(s) orally once a day (15 Aug 2021 16:15)                           MEDICATIONS:  STANDING MEDICATIONS  allopurinol 100 milliGRAM(s) Oral daily  amLODIPine   Tablet 10 milliGRAM(s) Oral daily  atorvastatin 20 milliGRAM(s) Oral at bedtime  hydrALAZINE 25 milliGRAM(s) Oral every 8 hours  labetalol 400 milliGRAM(s) Oral every 8 hours    PRN MEDICATIONS  acetaminophen   Tablet .. 650 milliGRAM(s) Oral every 8 hours PRN                                            ------------------------------------------------------------  VITAL SIGNS: Last 24 Hours  T(C): 36 (18 Aug 2021 05:29), Max: 36.2 (17 Aug 2021 13:40)  T(F): 96.8 (18 Aug 2021 05:29), Max: 97.2 (17 Aug 2021 21:05)  HR: 76 (18 Aug 2021 05:29) (76 - 86)  BP: 160/89 (18 Aug 2021 05:29) (114/68 - 160/89)  BP(mean): --  RR: 18 (18 Aug 2021 05:29) (18 - 18)  SpO2: 99% (17 Aug 2021 21:05) (99% - 99%)                                             --------------------------------------------------------------  LABS:                        12.2   8.03  )-----------( 194      ( 18 Aug 2021 06:02 )             38.7     08-18    138  |  104  |  29<H>  ----------------------------<  102<H>  3.6   |  22  |  2.8<H>    Ca    8.8      18 Aug 2021 06:02  Phos  4.0     08-18  Mg     1.7     08-18    TPro  5.9<L>  /  Alb  3.6  /  TBili  <0.2  /  DBili  x   /  AST  19  /  ALT  17  /  AlkPhos  66  08-18                Culture - Urine (collected 15 Aug 2021 13:28)  Source: Clean Catch Clean Catch (Midstream)  Final Report (16 Aug 2021 16:45):    No growth                                                    -------------------------------------------------------------  RADIOLOGY: CT A/P (08/15): No CT evidence for acute intra-abdominal or pelvic pathology.    Renal U/S (08/16): No hydronephrosis.    [facility] duplex (08/16): No evidence of significant hemodynamic stenosis noted in bilateral renal arteries.                                            --------------------------------------------------------------    PHYSICAL EXAM:  General: well-appearing, NAD  LUNGS: clear lung sounds b/l  HEART: +S1,S2, RRR  ABDOMEN: soft, NTND, BS+ 4 quads  EXT: no edema   NEURO: AAOx4  SKIN: warm, well-perfused, no abrasions                                           --------------------------------------------------------------    ASSESSMENT & PLAN    Past medical history and hospital course    45 year old man with hx of gout and HTN, presenting due to 2 week hx of hematuria    # FORREST and Hematuria -- r/o nephritic syndrome causes vs obstructive causes  - UA showing hematuria and proteinuria  - patient with uncontrolled HTN and mild edema  - Hb stable  - send urine lytes  - Urine protein/Creatinine ratio  - CT abdomen/pelvis noncontrast (8/15/2021):  No CT evidence for acute intra-abdominal or pelvic pathology.  - US Renal and bladder (8/15/21): no hydronephrosis  - Uro consult   - Nephro consult: Serum C3 and C4 complement levels  ANCA; using enzyme-linked immunosorbent assays [ELISAs] specific for proteinase-3 and myeloperoxidase) -  NEGATIVE  Anti GBM ABs   Antinuclear antibodies - NEGATIVE   Anti-dsDNA antibodies - NEGATIVE  Serology for hepatitis C virus, hepatitis B virus, and HIV - ALL NEGATIVE  Serum free light chains and serum immunofixation - WNL  24 hour urine collection PENDING      # Uncontrolled HTN  - c/w amlodipine 10mg OD (might swtich to nifedipine if BP not controlled)   - c/w labatalol to 400mg Q8H   - c/w hydralazine 25mg q8h  - holding Benazapril due to FORREST  - holding Chlorthalidone for now (patient reports he doesn't take it but pharmacy said he does)  - if high BP consider giving hydralazine     # Gout  - continue home allopurinol  - check uric acid level    # DVT prophylaxis: SCD  # GI proph: not needed  # Diet: DASH  # Dispo: acute JEOVANY HANSON 45y Male  MRN#: [id number]      Pt is currently admitted with the primary diagnosis of hematuria/proteinuria.    SUBJECTIVE  Hospital Day: 3d  Hospital Course: 45 year old man with hx of gout and HTN, presenting due to 2 week hx of hematuria. Patient reports that 2 weeks ago he started seeing some blood at the beginning of voiding. the hematuria is intermittent, not on a daily basis. with no other symptoms. no dysuria, urgency, frequency, back pain, or any other symptoms. no fever, chills, dyspnea, chest pain, diarrhea, hematochezia, melena, cough, or any other symptoms. patient hematuria has been the same since 2 weeks but not resolving. he drinks alcohol daily. non smoker. no FHx of renal disease. he reports his gout is under control with no flares in the past years. He states his blood pressure normally runs high.  In the ED, his bp was 200/100. labs showed Cr. 2.8, with BUN 26. UA showed proteinuria and small hematuria.    Patient is pending urology c/s. CT abdomen/pelvis noncontrast was negative for any acute intraabdominal pathology. US Renal and bladder was negative. [facility] renal duplex showed no stenosis. Nephrology recommended full w/u of possible nephritic syndrome causes - ANCA, Hep panel, DsDNA Ab are all NEGATIVE. Serum light chain ratio is negative. Urine protein/cr ratio is elevated at 0.7 and urine protein is elevated 1009. HIV testing is negative. No growth to date on urine cx. Patient pending 24 hour urine collection. Patient may need possible kidney bx if results inconclusive.    Overnight events : None.    Subjective complaints: None. Denies hematuria and headaches.                                             ----------------------------------------------------------  OBJECTIVE        PAST MEDICAL & SURGICAL HISTORY  HTN (hypertension)    Gout                                              -----------------------------------------------------------  ALLERGIES:  No Known Allergies                                            ------------------------------------------------------------    HOME MEDICATIONS  Home Medications:  allopurinol 100 mg oral tablet: 1 tab(s) orally once a day (15 Aug 2021 16:15)  amlodipine-benazepril 10 mg-40 mg oral capsule: 1 cap(s) orally once a day (15 Aug 2021 16:15)  atorvastatin 20 mg oral tablet: 1 tab(s) orally once a day (15 Aug 2021 16:15)  chlorthalidone 25 mg oral tablet: 1 tab(s) orally once a day (15 Aug 2021 16:15)                           MEDICATIONS:  STANDING MEDICATIONS  allopurinol 100 milliGRAM(s) Oral daily  amLODIPine   Tablet 10 milliGRAM(s) Oral daily  atorvastatin 20 milliGRAM(s) Oral at bedtime  hydrALAZINE 25 milliGRAM(s) Oral every 8 hours  labetalol 400 milliGRAM(s) Oral every 8 hours    PRN MEDICATIONS  acetaminophen   Tablet .. 650 milliGRAM(s) Oral every 8 hours PRN                                            ------------------------------------------------------------  VITAL SIGNS: Last 24 Hours  T(C): 36 (18 Aug 2021 05:29), Max: 36.2 (17 Aug 2021 13:40)  T(F): 96.8 (18 Aug 2021 05:29), Max: 97.2 (17 Aug 2021 21:05)  HR: 76 (18 Aug 2021 05:29) (76 - 86)  BP: 160/89 (18 Aug 2021 05:29) (114/68 - 160/89)  BP(mean): --  RR: 18 (18 Aug 2021 05:29) (18 - 18)  SpO2: 99% (17 Aug 2021 21:05) (99% - 99%)                                             --------------------------------------------------------------  LABS:                        12.2   8.03  )-----------( 194      ( 18 Aug 2021 06:02 )             38.7     08-18    138  |  104  |  29<H>  ----------------------------<  102<H>  3.6   |  22  |  2.8<H>    Ca    8.8      18 Aug 2021 06:02  Phos  4.0     08-18  Mg     1.7     08-18    TPro  5.9<L>  /  Alb  3.6  /  TBili  <0.2  /  DBili  x   /  AST  19  /  ALT  17  /  AlkPhos  66  08-18                Culture - Urine (collected 15 Aug 2021 13:28)  Source: Clean Catch Clean Catch (Midstream)  Final Report (16 Aug 2021 16:45):    No growth                                                    -------------------------------------------------------------  RADIOLOGY: CT A/P (08/15): No CT evidence for acute intra-abdominal or pelvic pathology.    Renal U/S (08/16): No hydronephrosis.    [facility] duplex (08/16): No evidence of significant hemodynamic stenosis noted in bilateral renal arteries.                                            --------------------------------------------------------------    PHYSICAL EXAM:  General: well-appearing, NAD  LUNGS: clear lung sounds b/l  HEART: +S1,S2, RRR  ABDOMEN: soft, NTND, BS+ 4 quads  EXT: no edema   NEURO: AAOx4  SKIN: warm, well-perfused, no abrasions                                           --------------------------------------------------------------    ASSESSMENT & PLAN    Past medical history and hospital course    45 year old man with hx of gout and HTN, presenting due to 2 week hx of hematuria    # FORREST and Hematuria -- r/o nephritic syndrome causes vs obstructive causes  - UA showing hematuria and proteinuria  - patient with uncontrolled HTN and mild edema  - Hb stable  - send urine lytes  - Urine protein/Creatinine ratio  - CT abdomen/pelvis noncontrast (8/15/2021):  No CT evidence for acute intra-abdominal or pelvic pathology.  - US Renal and bladder (8/15/21): no hydronephrosis  - Uro consult   - Nephro consult: Serum C3 and C4 complement levels  ANCA; using enzyme-linked immunosorbent assays [ELISAs] specific for proteinase-3 and myeloperoxidase) -  NEGATIVE  Anti GBM ABs PENDING  Antinuclear antibodies - NEGATIVE   Anti-dsDNA antibodies - NEGATIVE  Serology for hepatitis C virus, hepatitis B virus, and HIV - ALL NEGATIVE  Serum free light chains and serum immunofixation - WNL  24 hour urine collection PENDING      # Uncontrolled HTN  - c/w amlodipine 10mg OD (might swtich to nifedipine if BP not controlled)   - c/w labatalol to 400mg Q8H   - c/w hydralazine 25mg q8h  - holding Benazapril due to FORREST  - holding Chlorthalidone for now (patient reports he doesn't take it but pharmacy said he does)  - if high BP consider giving hydralazine     # Gout  - continue home allopurinol  - check uric acid level    # DVT prophylaxis: SCD  # GI proph: not needed  # Diet: DASH  # Dispo: acute

## 2021-08-18 NOTE — PROGRESS NOTE ADULT - SUBJECTIVE AND OBJECTIVE BOX
Patient was seen and examined.  Chart reviewed.  No events overnight.  Vital Signs Last 24 Hrs  T(F): 96.8 (18 Aug 2021 05:29), Max: 97.2 (17 Aug 2021 21:05)  HR: 76 (18 Aug 2021 05:29) (76 - 86)  BP: 160/89 (18 Aug 2021 05:29) (114/68 - 160/89)  SpO2: 99% (17 Aug 2021 21:05) (99% - 99%)  MEDICATIONS  (STANDING):  allopurinol 100 milliGRAM(s) Oral daily  amLODIPine   Tablet 10 milliGRAM(s) Oral daily  atorvastatin 20 milliGRAM(s) Oral at bedtime  hydrALAZINE 25 milliGRAM(s) Oral every 8 hours  labetalol 400 milliGRAM(s) Oral every 8 hours    MEDICATIONS  (PRN):  acetaminophen   Tablet .. 650 milliGRAM(s) Oral every 8 hours PRN Temp greater or equal to 38C (100.4F), Mild Pain (1 - 3)    Labs:                        12.2   8.03  )-----------( 194      ( 18 Aug 2021 06:02 )             38.7                         13.0   7.68  )-----------( 202      ( 17 Aug 2021 07:47 )             40.4     18 Aug 2021 06:02    138    |  104    |  29     ----------------------------<  102    3.6     |  22     |  2.8    17 Aug 2021 07:47    139    |  104    |  31     ----------------------------<  112    3.6     |  23     |  3.1      Ca    8.8        18 Aug 2021 06:02  Ca    9.5        17 Aug 2021 07:47  Phos  4.0       18 Aug 2021 06:02  Mg     1.7       18 Aug 2021 06:02    TPro  5.9    /  Alb  3.6    /  TBili  <0.2   /  DBili  x      /  AST  19     /  ALT  17     /  AlkPhos  66     18 Aug 2021 06:02  TPro  6.3    /  Alb  3.7    /  TBili  0.4    /  DBili  x      /  AST  17     /  ALT  15     /  AlkPhos  66     17 Aug 2021 07:47          Culture - Urine (collected 15 Aug 2021 13:28)  Source: Clean Catch Clean Catch (Midstream)  Final Report (16 Aug 2021 16:45):    No growth      General: comfortable, NAD  Neurology: A&Ox3, nonfocal  in good spirits      A/P:  45 year old man with hx of gout and HTN, presenting due to 2 week hx of hematuria noted with FORREST and HTN- admitted to r/o nephritic syndrome causes vs obstructive causes    BP control as per renal    Cr stable     24 hr urine collection    possible renal biopsy    plan as per renal today - to decide on inpt vs outpt workup    OOB, ambulate   DVT prophylaxis  Decubitus prevention- all measures as per RN protocol  Please call or text me with any questions or updates

## 2021-08-18 NOTE — PROGRESS NOTE ADULT - SUBJECTIVE AND OBJECTIVE BOX
Nephrology Progress Note    JEOVANY HANSON  MRN-[id number]  45y  Male    S:  Patient is seen and examined, events over the last 24h noted.    O:  Allergies:  No Known Allergies    Hospital Medications:   MEDICATIONS  (STANDING):  allopurinol 100 milliGRAM(s) Oral daily  amLODIPine   Tablet 10 milliGRAM(s) Oral daily  atorvastatin 20 milliGRAM(s) Oral at bedtime  hydrALAZINE 25 milliGRAM(s) Oral every 8 hours  labetalol 400 milliGRAM(s) Oral every 8 hours    MEDICATIONS  (PRN):  acetaminophen   Tablet .. 650 milliGRAM(s) Oral every 8 hours PRN Temp greater or equal to 38C (100.4F), Mild Pain (1 - 3)    Home Medications:  allopurinol 100 mg oral tablet: 1 tab(s) orally once a day (15 Aug 2021 16:15)  amlodipine-benazepril 10 mg-40 mg oral capsule: 1 cap(s) orally once a day (15 Aug 2021 16:15)  atorvastatin 20 mg oral tablet: 1 tab(s) orally once a day (15 Aug 2021 16:15)  chlorthalidone 25 mg oral tablet: 1 tab(s) orally once a day (15 Aug 2021 16:15)      VITALS:  T(F): 96.8 (21 @ 05:29), Max: 97.2 (21 @ 21:05)  HR: 76 (21 @ 05:29)  BP: 160/89 (21 @ 05:29)  RR: 18 (21 @ 05:29)  SpO2: 99% (21 @ 21:05)  Wt(kg): --  I&O's Detail    I&O's Summary        PHYSICAL EXAM:  Gen: NAD  Resp:   Card: S1/S2  Abd: soft  Extremities:   Vascular access:       LABS:          138  |  104  |  29<H>  ----------------------------<  102<H>  3.6   |  22  |  2.8<H>    Ca    8.8      18 Aug 2021 06:02  Phos  4.0       Mg     1.7         TPro  5.9<L>  /  Alb  3.6  /  TBili  <0.2  /  DBili      /  AST  19  /  ALT  17  /  AlkPhos  66      eGFR if Non African American: 26 mL/min/1.73M2 (21 @ 06:02)  eGFR if African American: 30 mL/min/1.73M2 (21 @ 06:02)    Phosphorus Level, Serum: 4.0 mg/dL (21 @ 06:02)                            12.2   8.03  )-----------( 194      ( 18 Aug 2021 06:02 )             38.7     Mean Cell Volume: 75.4 fL (21 @ 06:02)        Urine Studies:  Urinalysis Basic - ( 15 Aug 2021 13:28 )    Color: Light Yellow / Appearance: Clear / S.013 / pH:   Gluc:  / Ketone: Negative  / Bili: Negative / Urobili: <2 mg/dL   Blood:  / Protein: 300 mg/dL / Nitrite: Negative   Leuk Esterase: Negative / RBC: 8 /HPF / WBC 2 /HPF   Sq Epi:  / Non Sq Epi: 1 /HPF / Bacteria: Negative          Protein/Creatinine Ratio Calculation: 0.7 Ratio ( @ 13:58)  Creatinine, Random Urine: 107 mg/dL ( @ 13:58)    Creatinine trend:  Creatinine, Serum: 2.8 mg/dL (21 @ 06:02)  Creatinine, Serum: 3.1 mg/dL (21 @ 07:47)  Creatinine, Serum: 2.6 mg/dL (21 @ 08:10)  Creatinine, Serum: 2.8 mg/dL (08-15-21 @ 13:28)    Cytoplasmic (c-ANCA) Antibody: Negative (21 @ 08:10)  Perinuclear (p-ANCA) Antibody: Negative (21 @ 08:10)  Atypical ANCA: Negative (21 @ 08:10)    C3 Complement, Serum: 205 mg/dL (21 @ 08:10)  C4 Complement, Serum: 35 mg/dL (21 @ 08:10)          US Renal:   EXAM:  US KIDNEY(S)            PROCEDURE DATE:  2021            INTERPRETATION:  CLINICAL INFORMATION: Acute kidney injury and hematuria.    COMPARISON: CT abdomen and pelvis 8/15/2021.    TECHNIQUE: Sonography of the kidneys and bladder.    FINDINGS:    Right kidney: 10.1 cm. No hydronephrosis. 1 cm lower pole cyst.    Left kidney:  10.0 cm. No hydronephrosis.    Urinary bladder: Underdistended bladder.Bilateral ureteral jets are visualized.    Prostate: Measures 4 x 3 x 3 cm (19 cc).    IMPRESSION:    No hydronephrosis.    --- End of Report ---            HUMBERTO SEGOVIA MD; Resident Radiologist  This document has been electronically signed.  EFRAIN HIGGINBOTHAM MD; Attending Radiologist  This document has been electronically signed. Aug 16 2021 11:31AM (21 @ 10:47)       Nephrology Progress Note    JEOVANY HANSON  MRN-[id number]  45y  Male    S:  Patient is seen and examined, events over the last 24h noted.    O:  Allergies:  No Known Allergies    Hospital Medications:   MEDICATIONS  (STANDING):  allopurinol 100 milliGRAM(s) Oral daily  amLODIPine   Tablet 10 milliGRAM(s) Oral daily  atorvastatin 20 milliGRAM(s) Oral at bedtime  hydrALAZINE 25 milliGRAM(s) Oral every 8 hours  labetalol 400 milliGRAM(s) Oral every 8 hours    MEDICATIONS  (PRN):  acetaminophen   Tablet .. 650 milliGRAM(s) Oral every 8 hours PRN Temp greater or equal to 38C (100.4F), Mild Pain (1 - 3)    Home Medications:  allopurinol 100 mg oral tablet: 1 tab(s) orally once a day (15 Aug 2021 16:15)  amlodipine-benazepril 10 mg-40 mg oral capsule: 1 cap(s) orally once a day (15 Aug 2021 16:15)  atorvastatin 20 mg oral tablet: 1 tab(s) orally once a day (15 Aug 2021 16:15)  chlorthalidone 25 mg oral tablet: 1 tab(s) orally once a day (15 Aug 2021 16:15)      VITALS:  T(F): 96.8 (21 @ 05:29), Max: 97.2 (21 @ 21:05)  HR: 76 (21 @ 05:29)  BP: 160/89 (21 @ 05:29)  RR: 18 (21 @ 05:29)  SpO2: 99% (21 @ 21:05)  Wt(kg): --  I&O's Detail    I&O's Summary        PHYSICAL EXAM:  Gen: NAD  Resp: CTAB  Card: S1/S2  Abd: soft  Extremities: mild edema      LABS:          138  |  104  |  29<H>  ----------------------------<  102<H>  3.6   |  22  |  2.8<H>    Ca    8.8      18 Aug 2021 06:02  Phos  4.0       Mg     1.7         TPro  5.9<L>  /  Alb  3.6  /  TBili  <0.2  /  DBili      /  AST  19  /  ALT  17  /  AlkPhos  66      eGFR if Non African American: 26 mL/min/1.73M2 (21 @ 06:02)  eGFR if African American: 30 mL/min/1.73M2 (21 @ 06:02)    Phosphorus Level, Serum: 4.0 mg/dL (21 @ 06:02)                            12.2   8.03  )-----------( 194      ( 18 Aug 2021 06:02 )             38.7     Mean Cell Volume: 75.4 fL (21 @ 06:02)        Urine Studies:  Urinalysis Basic - ( 15 Aug 2021 13:28 )    Color: Light Yellow / Appearance: Clear / S.013 / pH:   Gluc:  / Ketone: Negative  / Bili: Negative / Urobili: <2 mg/dL   Blood:  / Protein: 300 mg/dL / Nitrite: Negative   Leuk Esterase: Negative / RBC: 8 /HPF / WBC 2 /HPF   Sq Epi:  / Non Sq Epi: 1 /HPF / Bacteria: Negative          Protein/Creatinine Ratio Calculation: 0.7 Ratio ( @ 13:58)  Creatinine, Random Urine: 107 mg/dL ( @ 13:58)    Creatinine trend:  Creatinine, Serum: 2.8 mg/dL (21 @ 06:02)  Creatinine, Serum: 3.1 mg/dL (21 @ 07:47)  Creatinine, Serum: 2.6 mg/dL (21 @ 08:10)  Creatinine, Serum: 2.8 mg/dL (08-15-21 @ 13:28)    Cytoplasmic (c-ANCA) Antibody: Negative (21 @ 08:10)  Perinuclear (p-ANCA) Antibody: Negative (21 @ 08:10)  Atypical ANCA: Negative (21 @ 08:10)    C3 Complement, Serum: 205 mg/dL (21 @ 08:10)  C4 Complement, Serum: 35 mg/dL (21 @ 08:10)  Double Stranded DNA Antibody: <12: Method: EIA            Reference Ranges            Interpretation            <= 29    IU/mL     Negative            30 - 75  IU/mL     Borderline            > 75      IU/mL     Positive IU/mL (21 @ 08:10)    HIV-1/2 Combo Result: Nonreact: The HIV Ag/Ab Combo test performed screens for HIV-1 p24 antigen,  antibodies to HIV-1 (group M and group O), and antibodies to HIV-2. All  specimens repeatedly reactive will reflex to an HIV 1/2 antibody  confirmation and differentiation test. This assay detects p24 antigen  which may be present prior to the development of HIV antibodies,  therefore a reactive result with a negative HIV 1/2 AB Confirmation  should be followed up with HIV-1 RNA, HIV-2 RNA and repeat testing in 4-8  weeks. A nonreactive result does not preclude previous exposure to or  infection with HIV-1 or HIV-2. (21 @ 08:10)        Hepatitis C Virus Interpretation: Nonreact: Hepatitis C AB  S/CO Ratio                        Interpretation  < 1.00                                   Non-Reactive  1.00 - 4.99                         Weakly-Reactive  >= 5.00                                Reactive  Non-Reactive: A person witha non-reactive HCV antibody result is  considered uninfected.  No further action is needed unless recent  infection is suspected.  In these cases, consider repeat testing later to  detect seroconversion..  Weakly-Reactive: HCV antibody test is abnormal, HCV RNA Qualitative test  will follow.  Reactive: HCV antibody test is abnormal, HCV RNA Qualitative test will  follow.  Note: HCV antibody testing is performed on the [employer]  system. (21 @ 08:10)          US Renal:   EXAM:  US KIDNEY(S)            PROCEDURE DATE:  2021          INTERPRETATION:  CLINICAL INFORMATION: Acute kidney injury and hematuria.    COMPARISON: CT abdomen and pelvis 8/15/2021.    TECHNIQUE: Sonography of the kidneys and bladder.    FINDINGS:    Right kidney: 10.1 cm. No hydronephrosis. 1 cm lower pole cyst.    Left kidney:  10.0 cm. No hydronephrosis.    Urinary bladder: Underdistended bladder.Bilateral ureteral jets are visualized.    Prostate: Measures 4 x 3 x 3 cm (19 cc).    IMPRESSION:    No hydronephrosis.    --- End of Report ---            HUMBERTO SEGOVIA MD; Resident Radiologist  This document has been electronically signed.  EFRAIN HIGGINBOTHAM MD; Attending Radiologist  This document has been electronically signed. Aug 16 2021 11:31AM (21 @ 10:47)

## 2021-08-19 ENCOUNTER — TRANSCRIPTION ENCOUNTER (OUTPATIENT)
Age: 46
End: 2021-08-19

## 2021-08-19 VITALS
TEMPERATURE: 97 F | SYSTOLIC BLOOD PRESSURE: 150 MMHG | HEART RATE: 70 BPM | DIASTOLIC BLOOD PRESSURE: 80 MMHG | RESPIRATION RATE: 18 BRPM

## 2021-08-19 LAB
ANION GAP SERPL CALC-SCNC: 9 MMOL/L — SIGNIFICANT CHANGE UP (ref 7–14)
BUN SERPL-MCNC: 26 MG/DL — HIGH (ref 10–20)
CALCIUM SERPL-MCNC: 9.1 MG/DL — SIGNIFICANT CHANGE UP (ref 8.5–10.1)
CHLORIDE SERPL-SCNC: 108 MMOL/L — SIGNIFICANT CHANGE UP (ref 98–110)
CO2 SERPL-SCNC: 24 MMOL/L — SIGNIFICANT CHANGE UP (ref 17–32)
CREAT SERPL-MCNC: 2.9 MG/DL — HIGH (ref 0.7–1.5)
GBM IGG SER-ACNC: 4 — SIGNIFICANT CHANGE UP (ref 0–20)
GLUCOSE SERPL-MCNC: 94 MG/DL — SIGNIFICANT CHANGE UP (ref 70–99)
HCT VFR BLD CALC: 39.3 % — LOW (ref 42–52)
HGB BLD-MCNC: 12.1 G/DL — LOW (ref 14–18)
MCHC RBC-ENTMCNC: 23.2 PG — LOW (ref 27–31)
MCHC RBC-ENTMCNC: 30.8 G/DL — LOW (ref 32–37)
MCV RBC AUTO: 75.3 FL — LOW (ref 80–94)
NRBC # BLD: 0 /100 WBCS — SIGNIFICANT CHANGE UP (ref 0–0)
PLATELET # BLD AUTO: 202 K/UL — SIGNIFICANT CHANGE UP (ref 130–400)
POTASSIUM SERPL-MCNC: 4.1 MMOL/L — SIGNIFICANT CHANGE UP (ref 3.5–5)
POTASSIUM SERPL-SCNC: 4.1 MMOL/L — SIGNIFICANT CHANGE UP (ref 3.5–5)
RBC # BLD: 5.22 M/UL — SIGNIFICANT CHANGE UP (ref 4.7–6.1)
RBC # FLD: 16.5 % — HIGH (ref 11.5–14.5)
SODIUM SERPL-SCNC: 141 MMOL/L — SIGNIFICANT CHANGE UP (ref 135–146)
TSH SERPL-MCNC: 1.96 UIU/ML — SIGNIFICANT CHANGE UP (ref 0.27–4.2)
WBC # BLD: 7.22 K/UL — SIGNIFICANT CHANGE UP (ref 4.8–10.8)
WBC # FLD AUTO: 7.22 K/UL — SIGNIFICANT CHANGE UP (ref 4.8–10.8)

## 2021-08-19 RX ORDER — HYDRALAZINE HCL 50 MG
1 TABLET ORAL
Qty: 90 | Refills: 2
Start: 2021-08-19 | End: 2021-11-16

## 2021-08-19 RX ORDER — AMLODIPINE BESYLATE AND BENAZEPRIL HYDROCHLORIDE 10; 20 MG/1; MG/1
1 CAPSULE ORAL
Qty: 0 | Refills: 0 | DISCHARGE

## 2021-08-19 RX ORDER — LABETALOL HCL 100 MG
2 TABLET ORAL
Qty: 0 | Refills: 0 | DISCHARGE
Start: 2021-08-19

## 2021-08-19 RX ORDER — AMLODIPINE BESYLATE 2.5 MG/1
1 TABLET ORAL
Qty: 0 | Refills: 0 | DISCHARGE
Start: 2021-08-19

## 2021-08-19 RX ORDER — AMLODIPINE BESYLATE 2.5 MG/1
1 TABLET ORAL
Qty: 30 | Refills: 0
Start: 2021-08-19 | End: 2021-09-17

## 2021-08-19 RX ORDER — LABETALOL HCL 100 MG
2 TABLET ORAL
Qty: 180 | Refills: 2
Start: 2021-08-19 | End: 2021-11-16

## 2021-08-19 RX ORDER — HYDRALAZINE HCL 50 MG
1 TABLET ORAL
Qty: 0 | Refills: 0 | DISCHARGE
Start: 2021-08-19

## 2021-08-19 RX ORDER — HYDRALAZINE HCL 50 MG
1 TABLET ORAL
Qty: 90 | Refills: 0
Start: 2021-08-19 | End: 2021-09-17

## 2021-08-19 RX ORDER — LABETALOL HCL 100 MG
2 TABLET ORAL
Qty: 180 | Refills: 0
Start: 2021-08-19 | End: 2021-09-17

## 2021-08-19 RX ORDER — CHLORTHALIDONE 50 MG
1 TABLET ORAL
Qty: 0 | Refills: 0 | DISCHARGE

## 2021-08-19 RX ORDER — AMLODIPINE BESYLATE 2.5 MG/1
1 TABLET ORAL
Qty: 30 | Refills: 2
Start: 2021-08-19 | End: 2021-11-16

## 2021-08-19 RX ADMIN — Medication 25 MILLIGRAM(S): at 05:19

## 2021-08-19 RX ADMIN — Medication 400 MILLIGRAM(S): at 05:19

## 2021-08-19 RX ADMIN — Medication 25 MILLIGRAM(S): at 13:33

## 2021-08-19 RX ADMIN — AMLODIPINE BESYLATE 10 MILLIGRAM(S): 2.5 TABLET ORAL at 05:19

## 2021-08-19 RX ADMIN — Medication 100 MILLIGRAM(S): at 11:21

## 2021-08-19 RX ADMIN — Medication 400 MILLIGRAM(S): at 13:33

## 2021-08-19 NOTE — DISCHARGE NOTE PROVIDER - CARE PROVIDERS DIRECT ADDRESSES
[email or web address]_Unknown

## 2021-08-19 NOTE — PROGRESS NOTE ADULT - SUBJECTIVE AND OBJECTIVE BOX
seen and examined   no distress   lying comfortable         PAST HISTORY  --------------------------------------------------------------------------------  No significant changes to PMH, PSH, FHx, SHx, unless otherwise noted    ALLERGIES & MEDICATIONS  --------------------------------------------------------------------------------  Allergies    No Known Allergies    Intolerances      Standing Inpatient Medications  allopurinol 100 milliGRAM(s) Oral daily  amLODIPine   Tablet 10 milliGRAM(s) Oral daily  atorvastatin 20 milliGRAM(s) Oral at bedtime  hydrALAZINE 25 milliGRAM(s) Oral every 8 hours  labetalol 400 milliGRAM(s) Oral every 8 hours    PRN Inpatient Medications  acetaminophen   Tablet .. 650 milliGRAM(s) Oral every 8 hours PRN            VITALS/PHYSICAL EXAM  --------------------------------------------------------------------------------  T(C): 36.5 (08-19-21 @ 05:48), Max: 36.5 (08-19-21 @ 05:48)  HR: 80 (08-19-21 @ 05:48) (76 - 80)  BP: 151/87 (08-19-21 @ 05:48) (145/93 - 151/87)  RR: 18 (08-19-21 @ 05:48) (18 - 18)  SpO2: --  Wt(kg): --        Physical Exam:  	Gen: NAD  	Pulm: CTA B/L  	CV:  S1S2; no rub  	Abd:  soft, nontender/nondistended      LABS/STUDIES  --------------------------------------------------------------------------------              12.2   8.03  >-----------<  194      [08-18-21 @ 06:02]              38.7     138  |  104  |  29  ----------------------------<  102      [08-18-21 @ 06:02]  3.6   |  22  |  2.8        Ca     8.8     [08-18-21 @ 06:02]      Mg     1.7     [08-18-21 @ 06:02]      Phos  4.0     [08-18-21 @ 06:02]    TPro  5.9  /  Alb  3.6  /  TBili  <0.2  /  DBili  x   /  AST  19  /  ALT  17  /  AlkPhos  66  [08-18-21 @ 06:02]      Creatinine Trend:  SCr 2.8 [08-18 @ 06:02]  SCr 3.1 [08-17 @ 07:47]  SCr 2.6 [08-16 @ 08:10]  SCr 2.8 [08-15 @ 13:28]    Urinalysis - [08-15-21 @ 13:28]      Color Light Yellow / Appearance Clear / SG 1.013 / pH 7.5      Gluc Trace / Ketone Negative  / Bili Negative / Urobili <2 mg/dL       Blood Small / Protein 300 mg/dL / Leuk Est Negative / Nitrite Negative      RBC 8 / WBC 2 / Hyaline 2 / Gran  / Sq Epi  / Non Sq Epi 1 / Bacteria Negative    Urine Creatinine 107      [08-16-21 @ 13:58]  Urine Protein 1009      [08-16-21 @ 14:36]    Lipid: chol 208, , HDL 55, LDL --      [08-17-21 @ 07:47]    HBsAg Nonreact      [08-16-21 @ 08:10]  HCV 0.18, Nonreact      [08-16-21 @ 08:10]  HIV Nonreact      [08-16-21 @ 08:10]    BIBIANA: titer 1:80, pattern Speckled      [08-16-21 @ 08:10]  dsDNA <12      [08-16-21 @ 08:10]  C3 Complement 205      [08-16-21 @ 08:10]  C4 Complement 35      [08-16-21 @ 08:10]  ANCA: cANCA Negative, pANCA Negative, atypical ANCA Negative      [08-16-21 @ 08:10]  Free Light Chains: kappa 2.68, lambda 3.74, ratio = 0.72      [08-16 @ 08:10]

## 2021-08-19 NOTE — DISCHARGE NOTE PROVIDER - HOSPITAL COURSE
45 year old man with hx of gout and HTN, presents due to 2 week hx of gross hematuria, describes as bright/dark blood at start of voiding, clears up as he voids, as well as bright blood leaking from tip of penis not a/w voiding. Cr found to be 2.8 at admission.     # creatinine stable   # + hematuria, urine pr/cr ratio noted, completed 24hr urine collection today, f/u results  # BP better controlled, hydralazine added, cont current regimen    # GN w/up negative so far: C3, C4, BIBIANA (low pos), DNA, ANCA, K/L, hepatitis profile, f/u PLA2 R ATB   #  as per yesterday notes :  pt's PMD Dr. Ramirez's office, obtained records of previous blood work, creat > 1 since 2017 has been progressively rising, was > 2 in 11/2020 and 2/2021, likely d/t hypertensive nephrosclerosis, bp has been uncontrolled, non compliant w/ anti-hypertensives and diet  # r/o secondary HTN- check renal doppler, renin/dorothea, TSH, iPTH, serum metanephrines  # pt can f/u nephrology outpatient for possible biopsy, though kidney impairment has been progressive, likely hypertensive, may not .  BP would need better control for kidney biopsy, improving this evening   # pt's description of hematuria sounds  in nature, should have urology eval as well, can be done outpatient if no recurrence currently    # no acute indication for RRT   # will follow    45 year old man with hx of gout and HTN, presents due to 2 week hx of gross hematuria, describes as bright/dark blood at start of voiding, clears up as he voids, as well as bright blood leaking from tip of penis not a/w voiding. Cr found to be 2.8 on admission. Obtained records of previous blood work, creat > 1 since 2017 has been progressively rising, was > 2 in 11/2020 and 2/2021, likely d/t hypertensive nephrosclerosis, bp has been uncontrolled, non compliant w/ anti-hypertensives and diet. Cr on D/C is 2.9. Pt's SBP upon admission was 200, antihypertensives adjusted to hydralazine 25mg PO q8h, labetalolol 400mg Q8h, and amlodipine 10mg daily. SBP range on d/c is 150-160.  Pt found to have a urine pr/ cr ratio 0.7 --> 24 hr urine collection done-- > f/u on results. Workup for secondary HTN: US renal doppler (8/16): No evidence of significant hemodynamic stenosis noted in bilateral renal arteries. GN w/up negative so far: C3, C4, BIBIANA (low pos), DNA, ANCA, K/L, hepatitis profile, f/u PLA2 R ATB--> Kidney impairment has been progressive, likely hypertensive,    On discharge:   f/u nephrology outpatient for possible biopsy  f/u renin/dorothea, TSH, iPTH, serum metanephrines  f/u on 24hr urine collection   f/u PLA2 R ATB    outpatient f/u for hematuria

## 2021-08-19 NOTE — PROGRESS NOTE ADULT - ASSESSMENT
45 year old man with hx of gout and HTN, presents due to 2 week hx of gross hematuria, describes as bright/dark blood at start of voiding, clears up as he voids, as well as bright blood leaking from tip of penis not a/w voiding     # creatinine stable   # + hematuria, urine pr/cr ratio noted, completed 24hr urine collection today, f/u results  # BP better controlled, hydralazine added, cont current regimen    # GN w/up negative so far: C3, C4, BIBIANA (low pos), DNA, ANCA, K/L, hepatitis profile, f/u PLA2 R ATB   #  as per yesterday notes :  pt's PMD Dr. Ramirez's office, obtained records of previous blood work, creat > 1 since 2017 has been progressively rising, was > 2 in 11/2020 and 2/2021, likely d/t hypertensive nephrosclerosis, bp has been uncontrolled, non compliant w/ anti-hypertensives and diet  # r/o secondary HTN- check renal doppler, renin/dorothea, TSH, iPTH, serum metanephrines  # pt can f/u nephrology outpatient for possible biopsy, though kidney impairment has been progressive, likely hypertensive, may not .  BP would need better control for kidney biopsy, improving this evening   # pt's description of hematuria sounds  in nature, should have urology eval as well, can be done outpatient if no recurrence currently    # no acute indication for RRT   # will follow 
45 year old man with hx of gout and HTN, presents due to 2 week hx of hematuria. Found to have 300 mg/dL protein + small blood   # creatinine stable   # around 9 g of protein, based on pr/ cr ratio, please start 24 h urine collection for pr/ cr / volume  # BP not well controlled, start hydralazine 25 q 8   # needs GN w/up : C3, C4, BIBIANA, DNA, ANCA, IF, K/L, hepatitis profile, PLA2 R ATB   # check ph level   # might need kidney biopsy 
45 year old man with hx of gout and HTN, presents due to 2 week hx of gross hematuria, describes as bright/dark blood at start of voiding, clears up as he voids, as well as bright blood leaking from tip of penis not a/w voiding     # creatinine stable   # + hematuria, urine pr/cr ratio noted, completed 24hr urine collection today, f/u results  # BP better controlled, hydralazine added, cont current regimen    # GN w/up negative so far: C3, C4, BIBIANA (low pos), DNA, ANCA, K/L, hepatitis profile, f/u PLA2 R ATB   # d/w pt's PMD Dr. Ramirez's office, obtained records of previous blood work, creat > 1 since 2017 has been progressively rising, was > 2 in 11/2020 and 2/2021, likely d/t hypertensive nephrosclerosis, bp has been uncontrolled, non compliant w/ anti-hypertensives and diet  # r/o secondary HTN- check renal doppler, renin/dorothea, TSH, iPTH, serum metanephrines  # phos level noted, does not need binder   # pt can f/u nephrology outpatient for possible biopsy, though kidney impairment has been progressive, likely hypertensive, may not .  BP would need better control for kidney biopsy, improving this evening   # pt's description of hematuria sounds  in nature, should have urology eval as well, can be done outpatient if no recurrence currently

## 2021-08-19 NOTE — DISCHARGE NOTE PROVIDER - PROVIDER TOKENS
PROVIDER:[TOKEN:[[id number]],FOLLOWUP:[1 week]],PROVIDER:[TOKEN:[[id number]],FOLLOWUP:[2 weeks]],PROVIDER:[TOKEN:[[id number]],FOLLOWUP:[2 weeks]]

## 2021-08-19 NOTE — DISCHARGE NOTE PROVIDER - NSDCCPCAREPLAN_GEN_ALL_CORE_FT
PRINCIPAL DISCHARGE DIAGNOSIS  Diagnosis: Hypertensive nephrosclerosis  Assessment and Plan of Treatment: You were found to have high blood pressure on admission. Uncontrolled High blood pressure leads to elevation in Cr. the Blood work done so far at the hospital were negative, but you still have few lab tests that were ordered here, but needs follow up. Your antihypertensive medications were adjusted during this admission. Please continue to take these medications as prescribed and monitor your BP at home closely.      SECONDARY DISCHARGE DIAGNOSES  Diagnosis: Hematuria  Assessment and Plan of Treatment: You were hevaing incidents of hematuria at home. You did not have any incidents of hematuria during this admission. Please follow up with urology as an OP to determine the exact cause of your hematuria.

## 2021-08-19 NOTE — DISCHARGE NOTE NURSING/CASE MANAGEMENT/SOCIAL WORK - PATIENT PORTAL LINK FT
You can access the FollowMyHealth Patient Portal offered by [facility] by registering at the following website: [email or web address]. By joining [employer]’s FollowMyHealth portal, you will also be able to view your health information using other applications (apps) compatible with our system.

## 2021-08-19 NOTE — DISCHARGE NOTE PROVIDER - NSDCMRMEDTOKEN_GEN_ALL_CORE_FT
allopurinol 100 mg oral tablet: 1 tab(s) orally once a day  amLODIPine 10 mg oral tablet: 1 tab(s) orally once a day  atorvastatin 20 mg oral tablet: 1 tab(s) orally once a day  hydrALAZINE 25 mg oral tablet: 1 tab(s) orally every 8 hours  labetalol 200 mg oral tablet: 2 tab(s) orally every 8 hours

## 2021-08-19 NOTE — DISCHARGE NOTE NURSING/CASE MANAGEMENT/SOCIAL WORK - NSDCPEFALRISK_GEN_ALL_CORE
For information on Fall & injury Prevention, visit [email or web address]

## 2021-08-19 NOTE — DISCHARGE NOTE PROVIDER - CARE PROVIDER_API CALL
Karrie Pinto)  Internal Medicine; Nephrology  [address] [place] [address]  Phone: [phone]  Fax: [phone]  Follow Up Time: 1 week    Tonja Ramirez  INTERNAL MEDICINE  [address] [place] [address]  Phone: [phone]  Fax: [phone]  Follow Up Time: 2 weeks    Gareth Dupree)  Urology  [address] [place] [address]  Phone: [phone]  Fax: [phone]  Follow Up Time: 2 weeks

## 2021-08-19 NOTE — PROGRESS NOTE ADULT - PROVIDER SPECIALTY LIST ADULT
Internal Medicine
Nephrology
Internal Medicine
Internal Medicine
Nephrology
Internal Medicine
Nephrology

## 2021-08-19 NOTE — PROGRESS NOTE ADULT - SUBJECTIVE AND OBJECTIVE BOX
Patient was seen and examined. Spoke with RN. Chart reviewed.  No events overnight.  Vital Signs Last 24 Hrs  T(F): 97.7 (19 Aug 2021 05:48), Max: 97.7 (19 Aug 2021 05:48)  HR: 80 (19 Aug 2021 05:48) (76 - 80)  BP: 151/87 (19 Aug 2021 05:48) (145/93 - 151/87)  SpO2: --  MEDICATIONS  (STANDING):  allopurinol 100 milliGRAM(s) Oral daily  amLODIPine   Tablet 10 milliGRAM(s) Oral daily  atorvastatin 20 milliGRAM(s) Oral at bedtime  hydrALAZINE 25 milliGRAM(s) Oral every 8 hours  labetalol 400 milliGRAM(s) Oral every 8 hours    MEDICATIONS  (PRN):  acetaminophen   Tablet .. 650 milliGRAM(s) Oral every 8 hours PRN Temp greater or equal to 38C (100.4F), Mild Pain (1 - 3)    Labs:                        12.2   8.03  )-----------( 194      ( 18 Aug 2021 06:02 )             38.7                         13.0   7.68  )-----------( 202      ( 17 Aug 2021 07:47 )             40.4     18 Aug 2021 06:02    138    |  104    |  29     ----------------------------<  102    3.6     |  22     |  2.8    17 Aug 2021 07:47    139    |  104    |  31     ----------------------------<  112    3.6     |  23     |  3.1      Ca    8.8        18 Aug 2021 06:02  Ca    9.5        17 Aug 2021 07:47  Phos  4.0       18 Aug 2021 06:02  Mg     1.7       18 Aug 2021 06:02    TPro  5.9    /  Alb  3.6    /  TBili  <0.2   /  DBili  x      /  AST  19     /  ALT  17     /  AlkPhos  66     18 Aug 2021 06:02  TPro  6.3    /  Alb  3.7    /  TBili  0.4    /  DBili  x      /  AST  17     /  ALT  15     /  AlkPhos  66     17 Aug 2021 07:47          General: comfortable, NAD  Neurology: A&Ox3, nonfocal      A/P:  45 year old man with hx of gout and HTN, presenting due to 2 week hx of hematuria noted with FORREST and HTN- admitted to r/o nephritic syndrome causes vs obstructive causes    BP control as per renal    24 hr urine collection done    send labs requested by renal    possible renal biopsy as outpt     as outpt    DC today    DVT prophylaxis  Decubitus prevention- all measures as per RN protocol  Please call or text me with any questions or updates

## 2021-08-20 LAB
ALDOST SERPL-MCNC: 30.3 NG/DL — HIGH
CALCIUM SERPL-MCNC: 9.2 MG/DL — SIGNIFICANT CHANGE UP (ref 8.4–10.5)
PTH-INTACT FLD-MCNC: 175 PG/ML — HIGH (ref 15–65)

## 2021-08-23 LAB
% GAMMA, URINE: 9.5 % — SIGNIFICANT CHANGE UP
ALBUMIN 24H MFR UR ELPH: 70.5 % — SIGNIFICANT CHANGE UP
ALPHA1 GLOB 24H MFR UR ELPH: 7.5 % — SIGNIFICANT CHANGE UP
ALPHA2 GLOB 24H MFR UR ELPH: 5 % — SIGNIFICANT CHANGE UP
B-GLOBULIN 24H MFR UR ELPH: 7.5 % — SIGNIFICANT CHANGE UP
COLLECT DURATION TIME UR: 24 HR — SIGNIFICANT CHANGE UP
INTERPRETATION 24H UR IFE-IMP: SIGNIFICANT CHANGE UP
INTERPRETATION 24H UR IFE-IMP: SIGNIFICANT CHANGE UP
M PROTEIN 24H UR ELPH-MRATE: 0 MG/24HR — SIGNIFICANT CHANGE UP (ref 0–0)
M PROTEIN 24H UR ELPH-MRATE: 0 MG/DL — SIGNIFICANT CHANGE UP
PROT ?TM UR-MCNC: 1009 MG/DL — HIGH (ref 0–12)
PROT PATTERN 24H UR ELPH-IMP: SIGNIFICANT CHANGE UP
PROTEIN QUANT CALC, URINE: HIGH MG/24 H (ref 50–100)
TOTAL VOLUME - 24 HOUR: 2175 ML — SIGNIFICANT CHANGE UP

## 2021-08-24 DIAGNOSIS — I16.0 HYPERTENSIVE URGENCY: ICD-10-CM

## 2021-08-24 DIAGNOSIS — R31.9 HEMATURIA, UNSPECIFIED: ICD-10-CM

## 2021-08-24 DIAGNOSIS — Z91.14 PATIENT'S OTHER NONCOMPLIANCE WITH MEDICATION REGIMEN: ICD-10-CM

## 2021-08-24 DIAGNOSIS — N18.9 CHRONIC KIDNEY DISEASE, UNSPECIFIED: ICD-10-CM

## 2021-08-24 DIAGNOSIS — M10.9 GOUT, UNSPECIFIED: ICD-10-CM

## 2021-08-24 DIAGNOSIS — N17.9 ACUTE KIDNEY FAILURE, UNSPECIFIED: ICD-10-CM

## 2021-08-24 DIAGNOSIS — I12.9 HYPERTENSIVE CHRONIC KIDNEY DISEASE WITH STAGE 1 THROUGH STAGE 4 CHRONIC KIDNEY DISEASE, OR UNSPECIFIED CHRONIC KIDNEY DISEASE: ICD-10-CM

## 2021-08-24 DIAGNOSIS — F10.10 ALCOHOL ABUSE, UNCOMPLICATED: ICD-10-CM

## 2021-08-24 LAB — INTERPRETATION SERPL IFE-IMP: SIGNIFICANT CHANGE UP

## 2021-08-25 LAB
METANEPHRINE, PL: 36.7 PG/ML — SIGNIFICANT CHANGE UP (ref 0–88)
NORMETANEPHRINE, PL: 605.3 PG/ML — HIGH (ref 0–125.8)

## 2021-08-27 LAB — RENIN PLAS-CCNC: 2.35 NG/ML/HR — SIGNIFICANT CHANGE UP (ref 0.17–5.38)

## 2021-12-24 ENCOUNTER — EMERGENCY (EMERGENCY)
Facility: HOSPITAL | Age: 46
LOS: 0 days | Discharge: HOME | End: 2021-12-24
Attending: EMERGENCY MEDICINE | Admitting: EMERGENCY MEDICINE
Payer: COMMERCIAL

## 2021-12-24 VITALS
DIASTOLIC BLOOD PRESSURE: 107 MMHG | RESPIRATION RATE: 18 BRPM | SYSTOLIC BLOOD PRESSURE: 206 MMHG | OXYGEN SATURATION: 98 % | TEMPERATURE: 98 F | HEART RATE: 74 BPM

## 2021-12-24 VITALS — HEIGHT: 73 IN | WEIGHT: 255.07 LBS
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
## 2021-12-24 DIAGNOSIS — S60.932A UNSPECIFIED SUPERFICIAL INJURY OF LEFT THUMB, INITIAL ENCOUNTER: ICD-10-CM
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
## 2021-12-24 DIAGNOSIS — M79.645 PAIN IN LEFT FINGER(S): ICD-10-CM

## 2021-12-24 DIAGNOSIS — I10 ESSENTIAL (PRIMARY) HYPERTENSION: ICD-10-CM

## 2021-12-24 PROBLEM — M10.9 GOUT, UNSPECIFIED: Chronic | Status: ACTIVE | Noted: 2021-08-15

## 2021-12-24 PROCEDURE — 99283 EMERGENCY DEPT VISIT LOW MDM: CPT

## 2021-12-24 PROCEDURE — 73140 X-RAY EXAM OF FINGER(S): CPT | Mod: 26,LT
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
## 2021-12-24 NOTE — ED PROVIDER NOTE - PATIENT PORTAL LINK FT
You can access the FollowMyHealth Patient Portal offered by [facility] by registering at the following website: [email or web address]. By joining [employer]’s FollowMyHealth portal, you will also be able to view your health information using other applications (apps) compatible with our system.

## 2021-12-24 NOTE — ED ADULT TRIAGE NOTE - CHIEF COMPLAINT QUOTE
I injured my thumb last Saturday, there is still pain and swelling, I can't flex it - patient  Patient reports he missed one dose of HTN medication
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
## 2021-12-24 NOTE — ED PROVIDER NOTE - CARE PROVIDER_API CALL
Shayan Avalos)  Orthopaedic Surgery  [address] [place] [address]  Phone: [phone]  Fax: [phone]  Follow Up Time: 7-10 Days

## 2021-12-24 NOTE — ED PROVIDER NOTE - PHYSICAL EXAMINATION
CONSTITUTIONAL: Well-developed; well-nourished; in no acute distress, nontoxic appearing  SKIN: skin exam is warm and dry,  CARD: S1, S2 normal, no murmur  RESP: No wheezes, rales or rhonchi. Good air movement bilaterally  ABD: soft; non-distended; non-tender. No Rebound, No guarding  EXT: + ttp over the shaft of the left thumb, no deformities or edema. Dp Pulses intact.   NEURO: awake, alert, following commands, oriented, grossly unremarkable. No Focal deficits. GCS 15.   PSYCH: Cooperative, appropriate.

## 2021-12-24 NOTE — ED ADULT NURSE NOTE - PAIN: BODY LOCATION
Pt states is 8-9 weeks gestation. Having hyperemesis. Has not kept anything down past 24 hours. Had with first preg and had to have a picc line. On numerous nausea med. But they are not working   left thumb

## 2021-12-24 NOTE — ED PROVIDER NOTE - CLINICAL SUMMARY MEDICAL DECISION MAKING FREE TEXT BOX
Pt with left thumb injury. Required imaging. No acute findings. Finger aced. Will dc with outpt f/up.

## 2023-09-25 ENCOUNTER — OUTPATIENT (OUTPATIENT)
Dept: OUTPATIENT SERVICES | Facility: HOSPITAL | Age: 48
LOS: 1 days | Discharge: HOME | End: 2023-09-25
Payer: MEDICAID

## 2023-09-25 ENCOUNTER — TRANSCRIPTION ENCOUNTER (OUTPATIENT)
Age: 48
End: 2023-09-25

## 2023-09-25 ENCOUNTER — RESULT REVIEW (OUTPATIENT)
Age: 48
End: 2023-09-25

## 2023-09-25 VITALS
DIASTOLIC BLOOD PRESSURE: 69 MMHG | RESPIRATION RATE: 18 BRPM | SYSTOLIC BLOOD PRESSURE: 115 MMHG | HEART RATE: 93 BPM | TEMPERATURE: 98 F | WEIGHT: 229.94 LBS | HEIGHT: 73 IN

## 2023-09-25 VITALS — OXYGEN SATURATION: 100 % | HEART RATE: 76 BPM | RESPIRATION RATE: 16 BRPM

## 2023-09-25 DIAGNOSIS — Z99.2 DEPENDENCE ON RENAL DIALYSIS: Chronic | ICD-10-CM

## 2023-09-25 DIAGNOSIS — Z12.11 ENCOUNTER FOR SCREENING FOR MALIGNANT NEOPLASM OF COLON: ICD-10-CM

## 2023-09-25 PROCEDURE — 88305 TISSUE EXAM BY PATHOLOGIST: CPT

## 2023-09-25 PROCEDURE — 88305 TISSUE EXAM BY PATHOLOGIST: CPT | Mod: 26

## 2023-09-25 RX ORDER — ATORVASTATIN CALCIUM 80 MG/1
1 TABLET, FILM COATED ORAL
Qty: 0 | Refills: 0 | DISCHARGE

## 2023-09-25 RX ORDER — NIFEDIPINE 30 MG
1 TABLET, EXTENDED RELEASE 24 HR ORAL
Refills: 0 | DISCHARGE

## 2023-09-25 RX ORDER — AMPICILLIN SODIUM AND SULBACTAM SODIUM 250; 125 MG/ML; MG/ML
INJECTION, POWDER, FOR SUSPENSION INTRAMUSCULAR; INTRAVENOUS
Refills: 0 | Status: DISCONTINUED | OUTPATIENT
Start: 2023-09-25 | End: 2023-09-25

## 2023-09-25 RX ORDER — AMPICILLIN SODIUM AND SULBACTAM SODIUM 250; 125 MG/ML; MG/ML
3 INJECTION, POWDER, FOR SUSPENSION INTRAMUSCULAR; INTRAVENOUS ONCE
Refills: 0 | Status: DISCONTINUED | OUTPATIENT
Start: 2023-09-25 | End: 2023-09-25

## 2023-09-25 RX ORDER — ALLOPURINOL 300 MG
1 TABLET ORAL
Qty: 0 | Refills: 0 | DISCHARGE

## 2023-09-25 NOTE — CHART NOTE - NSCHARTNOTEFT_GEN_A_CORE
PACU ANESTHESIA ADMISSION NOTE      Procedure: Colonoscopy  Post op diagnosis:      ____  Intubated  TV:______       Rate: ______      FiO2: ______    ___x_  Patent Airway    __x__  Full return of protective reflexes    _x___  Full recovery from anesthesia / back to baseline     Vitals:   T:  98.1         R:    14              BP:  96/58                Sat:   96%                P: 91      Mental Status:  __x__ Awake   __x___ Alert   _____ Drowsy   _____ Sedated    Nausea/Vomiting:  _x___ NO  ______Yes,   See Post - Op Orders          Pain Scale (0-10):  ___0__    Treatment: ____ None    ____ See Post - Op/PCA Orders    Post - Operative Fluids:   ____ Oral   __x__ See Post - Op Orders    Plan: Discharge:   __x__Home       _____Floor     _____Critical Care    _____  Other:_________________    Comments:  patient hemodynamically stable. HAndoff endorsed to PACU RN. No anesthesia related issues present. To be discharged home when criteria met

## 2023-09-25 NOTE — ASU DISCHARGE PLAN (ADULT/PEDIATRIC) - CARE PROVIDER_API CALL
Eyal Sandoval  Gastroenterology  [address] [place] [address]  Phone: [phone]  Fax: [phone]  Follow Up Time:

## 2023-09-28 DIAGNOSIS — K64.4 RESIDUAL HEMORRHOIDAL SKIN TAGS: ICD-10-CM

## 2023-09-28 DIAGNOSIS — K57.30 DIVERTICULOSIS OF LARGE INTESTINE WITHOUT PERFORATION OR ABSCESS WITHOUT BLEEDING: ICD-10-CM

## 2023-09-28 DIAGNOSIS — Z12.11 ENCOUNTER FOR SCREENING FOR MALIGNANT NEOPLASM OF COLON: ICD-10-CM

## 2023-09-28 DIAGNOSIS — K63.5 POLYP OF COLON: ICD-10-CM

## 2023-09-28 DIAGNOSIS — M10.9 GOUT, UNSPECIFIED: ICD-10-CM

## 2023-09-28 DIAGNOSIS — I12.9 HYPERTENSIVE CHRONIC KIDNEY DISEASE WITH STAGE 1 THROUGH STAGE 4 CHRONIC KIDNEY DISEASE, OR UNSPECIFIED CHRONIC KIDNEY DISEASE: ICD-10-CM

## 2023-09-28 DIAGNOSIS — Z99.2 DEPENDENCE ON RENAL DIALYSIS: ICD-10-CM

## 2023-09-28 DIAGNOSIS — N18.9 CHRONIC KIDNEY DISEASE, UNSPECIFIED: ICD-10-CM

## 2023-09-28 LAB
SURGICAL PATHOLOGY STUDY: SIGNIFICANT CHANGE UP

## 2024-09-22 NOTE — ED ADULT NURSE NOTE - CAS ELECT INFOMATION PROVIDED
Cortes Schroeder is a 61 y.o. male with PMH of DM, HTN  and right trimalleolar ankle fracture status post ex fix on 07/18 with subsequent definitive fixation on 07/26 admitted with right ankle wound dehiscence in the setting of uncontrolled diabetes.      He is s/p I&D of R ankle 09/06. Repeat I&D R medial ankle 09/09. 9/17 angiography and medial ankle wound vac exchange.     To OR 9/20 for repeat I&D and vac exchange. Free flap pending plastics eval.     Diet: okay for diet; NPO midnight   Pain control: multimodal regimen  PT/OT:  NWB RLE   DVT PPx: Lvx   Abx:  oxacillin continuous; ID following   Cultures:  ankle cx staph +    Dispo: I&D and vac exchange tomorrow, possible OR with plastics Wednesday 9/25   given by MD Diop/ELKE instructions

## 2024-10-22 PROBLEM — Z00.00 ENCOUNTER FOR PREVENTIVE HEALTH EXAMINATION: Status: ACTIVE | Noted: 2024-10-22

## 2025-01-13 ENCOUNTER — OUTPATIENT (OUTPATIENT)
Dept: OUTPATIENT SERVICES | Facility: HOSPITAL | Age: 50
LOS: 1 days | End: 2025-01-13
Payer: MEDICARE

## 2025-01-13 ENCOUNTER — APPOINTMENT (OUTPATIENT)
Dept: PSYCHIATRY | Facility: CLINIC | Age: 50
End: 2025-01-13

## 2025-01-13 ENCOUNTER — INPATIENT (INPATIENT)
Facility: HOSPITAL | Age: 50
LOS: 3 days | Discharge: HOME | DRG: 897 | End: 2025-01-17
Attending: INTERNAL MEDICINE | Admitting: INTERNAL MEDICINE
Payer: MEDICARE

## 2025-01-13 ENCOUNTER — OUTPATIENT (OUTPATIENT)
Dept: OUTPATIENT SERVICES | Facility: HOSPITAL | Age: 50
LOS: 1 days | End: 2025-01-13

## 2025-01-13 VITALS
WEIGHT: 214.95 LBS | HEIGHT: 73 IN | TEMPERATURE: 98 F | RESPIRATION RATE: 18 BRPM | OXYGEN SATURATION: 100 % | DIASTOLIC BLOOD PRESSURE: 82 MMHG | SYSTOLIC BLOOD PRESSURE: 142 MMHG | HEART RATE: 95 BPM

## 2025-01-13 DIAGNOSIS — Z99.2 DEPENDENCE ON RENAL DIALYSIS: Chronic | ICD-10-CM

## 2025-01-13 DIAGNOSIS — F10.239 ALCOHOL DEPENDENCE WITH WITHDRAWAL, UNSPECIFIED: ICD-10-CM

## 2025-01-13 DIAGNOSIS — F10.20 ALCOHOL DEPENDENCE, UNCOMPLICATED: ICD-10-CM

## 2025-01-13 PROBLEM — N18.9 CHRONIC KIDNEY DISEASE, UNSPECIFIED: Chronic | Status: ACTIVE | Noted: 2023-09-25

## 2025-01-13 LAB
ALBUMIN SERPL ELPH-MCNC: 4.6 G/DL — SIGNIFICANT CHANGE UP (ref 3.5–5.2)
ALBUMIN SERPL ELPH-MCNC: 4.8 G/DL — SIGNIFICANT CHANGE UP (ref 3.5–5.2)
ALP SERPL-CCNC: 77 U/L — SIGNIFICANT CHANGE UP (ref 30–115)
ALP SERPL-CCNC: 83 U/L — SIGNIFICANT CHANGE UP (ref 30–115)
ALT FLD-CCNC: 12 U/L — SIGNIFICANT CHANGE UP (ref 0–41)
ALT FLD-CCNC: 13 U/L — SIGNIFICANT CHANGE UP (ref 0–41)
ANION GAP SERPL CALC-SCNC: 19 MMOL/L — HIGH (ref 7–14)
AST SERPL-CCNC: 15 U/L — SIGNIFICANT CHANGE UP (ref 0–41)
AST SERPL-CCNC: 19 U/L — SIGNIFICANT CHANGE UP (ref 0–41)
BASOPHILS # BLD AUTO: 0.06 K/UL — SIGNIFICANT CHANGE UP (ref 0–0.2)
BASOPHILS NFR BLD AUTO: 0.7 % — SIGNIFICANT CHANGE UP (ref 0–1)
BILIRUB DIRECT SERPL-MCNC: 0.3 MG/DL — SIGNIFICANT CHANGE UP (ref 0–0.3)
BILIRUB INDIRECT FLD-MCNC: 0.3 MG/DL — SIGNIFICANT CHANGE UP (ref 0.2–1.2)
BILIRUB SERPL-MCNC: 0.5 MG/DL — SIGNIFICANT CHANGE UP (ref 0.2–1.2)
BILIRUB SERPL-MCNC: 0.6 MG/DL — SIGNIFICANT CHANGE UP (ref 0.2–1.2)
BUN SERPL-MCNC: 43 MG/DL — HIGH (ref 10–20)
CALCIUM SERPL-MCNC: 9.3 MG/DL — SIGNIFICANT CHANGE UP (ref 8.4–10.5)
CHLORIDE SERPL-SCNC: 97 MMOL/L — LOW (ref 98–110)
CO2 SERPL-SCNC: 26 MMOL/L — SIGNIFICANT CHANGE UP (ref 17–32)
CREAT SERPL-MCNC: 10.6 MG/DL — CRITICAL HIGH (ref 0.7–1.5)
EGFR: 5 ML/MIN/1.73M2 — LOW
EOSINOPHIL # BLD AUTO: 0.23 K/UL — SIGNIFICANT CHANGE UP (ref 0–0.7)
EOSINOPHIL NFR BLD AUTO: 2.8 % — SIGNIFICANT CHANGE UP (ref 0–8)
GLUCOSE SERPL-MCNC: 76 MG/DL — SIGNIFICANT CHANGE UP (ref 70–99)
HCT VFR BLD CALC: 37 % — LOW (ref 42–52)
HGB BLD-MCNC: 11.8 G/DL — LOW (ref 14–18)
IMM GRANULOCYTES NFR BLD AUTO: 0.2 % — SIGNIFICANT CHANGE UP (ref 0.1–0.3)
LYMPHOCYTES # BLD AUTO: 2.07 K/UL — SIGNIFICANT CHANGE UP (ref 1.2–3.4)
LYMPHOCYTES # BLD AUTO: 25.6 % — SIGNIFICANT CHANGE UP (ref 20.5–51.1)
MCHC RBC-ENTMCNC: 26.4 PG — LOW (ref 27–31)
MCHC RBC-ENTMCNC: 31.9 G/DL — LOW (ref 32–37)
MCV RBC AUTO: 82.8 FL — SIGNIFICANT CHANGE UP (ref 80–94)
MONOCYTES # BLD AUTO: 0.76 K/UL — HIGH (ref 0.1–0.6)
MONOCYTES NFR BLD AUTO: 9.4 % — HIGH (ref 1.7–9.3)
NEUTROPHILS # BLD AUTO: 4.96 K/UL — SIGNIFICANT CHANGE UP (ref 1.4–6.5)
NEUTROPHILS NFR BLD AUTO: 61.3 % — SIGNIFICANT CHANGE UP (ref 42.2–75.2)
NRBC # BLD: 0 /100 WBCS — SIGNIFICANT CHANGE UP (ref 0–0)
PLATELET # BLD AUTO: 172 K/UL — SIGNIFICANT CHANGE UP (ref 130–400)
PMV BLD: 12.3 FL — HIGH (ref 7.4–10.4)
POTASSIUM SERPL-MCNC: 4.6 MMOL/L — SIGNIFICANT CHANGE UP (ref 3.5–5)
POTASSIUM SERPL-SCNC: 4.6 MMOL/L — SIGNIFICANT CHANGE UP (ref 3.5–5)
PROT SERPL-MCNC: 7.3 G/DL — SIGNIFICANT CHANGE UP (ref 6–8)
PROT SERPL-MCNC: 7.4 G/DL — SIGNIFICANT CHANGE UP (ref 6–8)
RBC # BLD: 4.47 M/UL — LOW (ref 4.7–6.1)
RBC # FLD: 17.2 % — HIGH (ref 11.5–14.5)
SODIUM SERPL-SCNC: 142 MMOL/L — SIGNIFICANT CHANGE UP (ref 135–146)
WBC # BLD: 8.1 K/UL — SIGNIFICANT CHANGE UP (ref 4.8–10.8)
WBC # FLD AUTO: 8.1 K/UL — SIGNIFICANT CHANGE UP (ref 4.8–10.8)

## 2025-01-13 PROCEDURE — 80048 BASIC METABOLIC PNL TOTAL CA: CPT

## 2025-01-13 PROCEDURE — 99291 CRITICAL CARE FIRST HOUR: CPT

## 2025-01-13 PROCEDURE — 80307 DRUG TEST PRSMV CHEM ANLYZR: CPT

## 2025-01-13 PROCEDURE — 82330 ASSAY OF CALCIUM: CPT

## 2025-01-13 PROCEDURE — 36415 COLL VENOUS BLD VENIPUNCTURE: CPT

## 2025-01-13 PROCEDURE — 83550 IRON BINDING TEST: CPT

## 2025-01-13 PROCEDURE — 82962 GLUCOSE BLOOD TEST: CPT

## 2025-01-13 PROCEDURE — 80346 BENZODIAZEPINES1-12: CPT

## 2025-01-13 PROCEDURE — 83540 ASSAY OF IRON: CPT

## 2025-01-13 PROCEDURE — 82728 ASSAY OF FERRITIN: CPT

## 2025-01-13 PROCEDURE — 80076 HEPATIC FUNCTION PANEL: CPT

## 2025-01-13 PROCEDURE — 82746 ASSAY OF FOLIC ACID SERUM: CPT

## 2025-01-13 PROCEDURE — 82607 VITAMIN B-12: CPT

## 2025-01-13 PROCEDURE — H0038: CPT

## 2025-01-13 PROCEDURE — 87640 STAPH A DNA AMP PROBE: CPT

## 2025-01-13 PROCEDURE — 80354 DRUG SCREENING FENTANYL: CPT

## 2025-01-13 PROCEDURE — 84100 ASSAY OF PHOSPHORUS: CPT

## 2025-01-13 PROCEDURE — 99222 1ST HOSP IP/OBS MODERATE 55: CPT

## 2025-01-13 PROCEDURE — 87641 MR-STAPH DNA AMP PROBE: CPT

## 2025-01-13 PROCEDURE — 80053 COMPREHEN METABOLIC PANEL: CPT

## 2025-01-13 PROCEDURE — 83735 ASSAY OF MAGNESIUM: CPT

## 2025-01-13 PROCEDURE — 90935 HEMODIALYSIS ONE EVALUATION: CPT

## 2025-01-13 PROCEDURE — 85025 COMPLETE CBC W/AUTO DIFF WBC: CPT

## 2025-01-13 PROCEDURE — 93010 ELECTROCARDIOGRAM REPORT: CPT

## 2025-01-13 RX ORDER — CHLORDIAZEPOXIDE HCL 5 MG
50 CAPSULE ORAL EVERY 6 HOURS
Refills: 0 | Status: DISCONTINUED | OUTPATIENT
Start: 2025-01-13 | End: 2025-01-14

## 2025-01-13 RX ORDER — CHLORDIAZEPOXIDE HCL 5 MG
50 CAPSULE ORAL EVERY 8 HOURS
Refills: 0 | Status: DISCONTINUED | OUTPATIENT
Start: 2025-01-14 | End: 2025-01-15

## 2025-01-13 RX ORDER — LABETALOL HCL 300 MG/1
1 TABLET, FILM COATED ORAL
Refills: 0 | DISCHARGE

## 2025-01-13 RX ORDER — SODIUM CHLORIDE 9 MG/ML
1000 INJECTION, SOLUTION INTRAVENOUS
Refills: 0 | Status: DISCONTINUED | OUTPATIENT
Start: 2025-01-13 | End: 2025-01-15

## 2025-01-13 RX ORDER — CHLORDIAZEPOXIDE HCL 5 MG
CAPSULE ORAL
Refills: 0 | Status: COMPLETED | OUTPATIENT
Start: 2025-01-15 | End: 2025-01-16

## 2025-01-13 RX ORDER — THIAMINE HYDROCHLORIDE 100 MG/ML
100 INJECTION, SOLUTION INTRAMUSCULAR; INTRAVENOUS DAILY
Refills: 0 | Status: DISCONTINUED | OUTPATIENT
Start: 2025-01-13 | End: 2025-01-14

## 2025-01-13 RX ORDER — B COMPLEX, C NO.20/FOLIC ACID 1 MG
1 CAPSULE ORAL DAILY
Refills: 0 | Status: DISCONTINUED | OUTPATIENT
Start: 2025-01-13 | End: 2025-01-17

## 2025-01-13 RX ORDER — LABETALOL HCL 300 MG/1
200 TABLET, FILM COATED ORAL AT BEDTIME
Refills: 0 | Status: DISCONTINUED | OUTPATIENT
Start: 2025-01-13 | End: 2025-01-17

## 2025-01-13 RX ORDER — HEPARIN SODIUM 1000 [USP'U]/ML
5000 INJECTION, SOLUTION INTRAVENOUS; SUBCUTANEOUS EVERY 12 HOURS
Refills: 0 | Status: DISCONTINUED | OUTPATIENT
Start: 2025-01-13 | End: 2025-01-17

## 2025-01-13 RX ORDER — DIAZEPAM 5 MG
10 TABLET ORAL ONCE
Refills: 0 | Status: DISCONTINUED | OUTPATIENT
Start: 2025-01-13 | End: 2025-01-13

## 2025-01-13 RX ORDER — CHLORDIAZEPOXIDE HCL 5 MG
25 CAPSULE ORAL
Refills: 0 | Status: DISCONTINUED | OUTPATIENT
Start: 2025-01-13 | End: 2025-01-16

## 2025-01-13 RX ORDER — ATORVASTATIN CALCIUM 40 MG/1
1 TABLET, FILM COATED ORAL
Refills: 0 | DISCHARGE

## 2025-01-13 RX ORDER — LOSARTAN POTASSIUM 100 MG/1
1 TABLET, FILM COATED ORAL
Refills: 0 | DISCHARGE

## 2025-01-13 RX ORDER — CHLORDIAZEPOXIDE HCL 5 MG
25 CAPSULE ORAL ONCE
Refills: 0 | Status: DISCONTINUED | OUTPATIENT
Start: 2025-01-13 | End: 2025-01-13

## 2025-01-13 RX ORDER — ALLOPURINOL 100 MG/1
100 TABLET ORAL DAILY
Refills: 0 | Status: DISCONTINUED | OUTPATIENT
Start: 2025-01-13 | End: 2025-01-17

## 2025-01-13 RX ORDER — LOSARTAN POTASSIUM 100 MG/1
100 TABLET, FILM COATED ORAL DAILY
Refills: 0 | Status: DISCONTINUED | OUTPATIENT
Start: 2025-01-13 | End: 2025-01-17

## 2025-01-13 RX ORDER — NIFEDIPINE 60 MG/1
90 TABLET, EXTENDED RELEASE ORAL DAILY
Refills: 0 | Status: DISCONTINUED | OUTPATIENT
Start: 2025-01-13 | End: 2025-01-17

## 2025-01-13 RX ORDER — VITAMIN A 10000 UNIT
1 TABLET ORAL DAILY
Refills: 0 | Status: DISCONTINUED | OUTPATIENT
Start: 2025-01-13 | End: 2025-01-17

## 2025-01-13 RX ORDER — SODIUM CHLORIDE 9 MG/ML
1000 INJECTION, SOLUTION INTRAMUSCULAR; INTRAVENOUS; SUBCUTANEOUS ONCE
Refills: 0 | Status: COMPLETED | OUTPATIENT
Start: 2025-01-13 | End: 2025-01-13

## 2025-01-13 RX ORDER — CHLORDIAZEPOXIDE HCL 5 MG
50 CAPSULE ORAL EVERY 12 HOURS
Refills: 0 | Status: DISCONTINUED | OUTPATIENT
Start: 2025-01-16 | End: 2025-01-16

## 2025-01-13 RX ORDER — ATORVASTATIN CALCIUM 40 MG/1
40 TABLET, FILM COATED ORAL AT BEDTIME
Refills: 0 | Status: DISCONTINUED | OUTPATIENT
Start: 2025-01-13 | End: 2025-01-17

## 2025-01-13 RX ORDER — SODIUM CHLORIDE 9 MG/ML
1000 INJECTION, SOLUTION INTRAVENOUS
Refills: 0 | Status: DISCONTINUED | OUTPATIENT
Start: 2025-01-13 | End: 2025-01-13

## 2025-01-13 RX ADMIN — Medication 10 MILLIGRAM(S): at 14:53

## 2025-01-13 RX ADMIN — SODIUM CHLORIDE 1000 MILLILITER(S): 9 INJECTION, SOLUTION INTRAMUSCULAR; INTRAVENOUS; SUBCUTANEOUS at 14:39

## 2025-01-13 RX ADMIN — ATORVASTATIN CALCIUM 40 MILLIGRAM(S): 40 TABLET, FILM COATED ORAL at 23:36

## 2025-01-13 RX ADMIN — LABETALOL HCL 200 MILLIGRAM(S): 300 TABLET, FILM COATED ORAL at 23:36

## 2025-01-13 RX ADMIN — Medication 25 MILLIGRAM(S): at 16:56

## 2025-01-13 RX ADMIN — SODIUM CHLORIDE 40 MILLILITER(S): 9 INJECTION, SOLUTION INTRAVENOUS at 20:11

## 2025-01-13 RX ADMIN — Medication 50 MILLIGRAM(S): at 20:11

## 2025-01-13 NOTE — H&P ADULT - ASSESSMENT
49 year old man with hx of ESRD on HD, gout and HTN, alcohol use disorder presents for inpatient detox.   Ciwa 6. Seen by SBIRT, not good candidate for outpt detox, and pt very motivated today, so reasonable to take advantage of motivation today and admit for detox.    INCOMPLETE NOTE     #Detox  #Alcohol withdrawal  -patient is motivated to start management  -last drink was   -start folate, thiamine and multivitamins  -check vitamins levels  -[facility] protocol standing and as needed  -Addiction med consult    #HTN  #Gout  #ESRD on HD     #DVT ppx: Heparin 5000 BID  #Diet DASH  #Protonix not indicated    49 year old man with hx of ESRD on HD, gout and HTN, alcohol use disorder presents for inpatient detox.   Ciwa 6. Seen by SBIRT, not good candidate for outpt detox, and pt very motivated today, so reasonable to take advantage of motivation today and admit for detox.    #Detox  #Alcohol withdrawal  -patient is motivated to start management  -last drink was last night at 11 pm. He drinks Vodka everyday.    -start folate, thiamine and multivitamins  -D5 at 40 ml/hr - check FS BID  -check vitamins level  -[facility] protocol standing and as needed  -Addiction med consult  -check urine drug screen   -CMP, Mg, P in am     #HTN  #Gout  #ESRD on HD   -patient on HD, TTS, consult nephro   -c/w Labetalol 200, Nifedipine 90, and Losartan 100  -c/w Allopurinol 100 OD and Lipitor 40 qhs    #DVT ppx: Heparin 5000 BID  #Diet DASH  #Protonix not indicated

## 2025-01-13 NOTE — H&P ADULT - HISTORY OF PRESENT ILLNESS
49 year old man with hx of ESRD on HD, gout and HTN, alcohol use disorder presents for inpatient detox.   Ciwa 6. Seen by SBIRT, not good candidate for outpt detox, and pt very motivated today, so reasonable to take advantage of motivation today and admit for detox.    In ED:  vitals within normal range   wbc 8  hb 11.8  MCV 82  Plt 172  normal LFTs   Crea 10.6 BUN 43     s/p Librium 25 mg po in ED and Vlium 10 mg IV x1

## 2025-01-13 NOTE — H&P ADULT - ATTENDING COMMENTS
HPI:  49 year old man with hx of ESRD on HD, gout and HTN, alcohol use disorder presents for inpatient detox.   Ciwa 6. Seen by SBIRT, not good candidate for outpt detox, and pt very motivated today, so reasonable to take advantage of motivation today and admit for detox.    In ED:  vitals within normal range   wbc 8  hb 11.8  MCV 82  Plt 172  normal LFTs   Crea 10.6 BUN 43     s/p Librium 25 mg po in ED and Vlium 10 mg IV x1  (13 Jan 2025 18:54)  REVIEW OF SYSTEMS: see cc/HPI   CONSTITUTIONAL: No weakness, fevers or chills  EYES/ENT: No visual changes;  No vertigo or throat pain   NECK: No pain or stiffness  RESPIRATORY: No cough, wheezing, hemoptysis; No shortness of breath  CARDIOVASCULAR: No chest pain or palpitations  GASTROINTESTINAL: No abdominal or epigastric pain. No nausea, vomiting, or hematemesis; No diarrhea or constipation. No melena or hematochezia.  GENITOURINARY: No dysuria, frequency or hematuria, (+) ESRD   NEUROLOGICAL: No numbness or weakness  SKIN: No itching, rashes  ENDO: No hyperglycemia, No thyroid disorder, No dyslipidemia   HEM: No bleeding, No easy bruising, No anemia   PSYCHE: No psychosis, No mood disorder No hallucinations No delusion, (+) alcohol abuse  MSK: No deformity, No fracture, No Joint swelling    Physical Exam:   General: WN/WD NAD  Neurology: A&Ox3, nonfocal, follows commands  Eyes: PERRLA/ EOMI  ENT/Neck: Neck supple, trachea midline, No JVD  Respiratory: CTA B/L, No wheezing, rales, rhonchi  CV: Normal rate regular rhythm, S1S2, no murmurs, rubs or gallops  Abdominal: Soft, NT, ND +BS,   Extremities: No edema, + peripheral pulses  Skin: No Rashes, Hematoma, Ecchymosis    A/p  Alcohol use disorder   Alcohol withdrawal     ESRD   HTN     Gout     DVT prophylaxis HPI:  49 year old man with hx of ESRD on HD, gout and HTN, alcohol use disorder presents for inpatient detox.   Ciwa 6. Seen by SBIRT, not good candidate for outpt detox, and pt very motivated today, so reasonable to take advantage of motivation today and admit for detox.    In ED:  vitals within normal range   wbc 8  hb 11.8  MCV 82  Plt 172  normal LFTs   Crea 10.6 BUN 43     s/p Librium 25 mg po in ED and Valium 10 mg IV x1  (13 Jan 2025 18:54)  REVIEW OF SYSTEMS: see cc/HPI   CONSTITUTIONAL: No weakness, fevers or chills  EYES/ENT: No visual changes;  No vertigo or throat pain   NECK: No pain or stiffness  RESPIRATORY: No cough, wheezing, hemoptysis; No shortness of breath  CARDIOVASCULAR: No chest pain or palpitations  GASTROINTESTINAL: No abdominal or epigastric pain. No nausea, vomiting, or hematemesis; No diarrhea or constipation. No melena or hematochezia.  GENITOURINARY: No dysuria, frequency or hematuria, (+) ESRD   NEUROLOGICAL: No numbness or weakness  SKIN: No itching, rashes  ENDO: No hyperglycemia, No thyroid disorder, No dyslipidemia   HEM: No bleeding, No easy bruising, No anemia   PSYCHE: No psychosis, No mood disorder No hallucinations No delusion, (+) alcohol abuse  MSK: No deformity, No fracture, No Joint swelling    Physical Exam:   General: WN/WD NAD  Neurology: A&Ox3, nonfocal, follows commands, no asterixis   Eyes: PERRLA/ EOMI  ENT/Neck: Neck supple, trachea midline, No JVD  Respiratory: CTA B/L, No wheezing, rales, rhonchi  CV: Normal rate regular rhythm, S1S2, no murmurs, rubs or gallops  Abdominal: Soft, NT, ND +BS,   Extremities: No edema, + peripheral pulses  Skin: No Rashes, Hematoma, Ecchymosis    A/p  Alcohol use disorder   Alcohol withdrawal   -c/w Detox protocol   -MVI/Folate/Thiamine   -Behavioral Jagdeep consult   -agree w/ UDS and serial CMP     ESRD   -c/w OP Rx  -Nephrology for HD Rx    Dyslipidemia / HTN  c/w OP Rx     Gout     DVT prophylaxis HPI:  49 year old man with hx of ESRD on HD, gout and HTN, alcohol use disorder presents for inpatient detox.   Ciwa 6. Seen by SBIRT, not good candidate for outpt detox, and pt very motivated today, so reasonable to take advantage of motivation today and admit for detox.    In ED:  vitals within normal range   wbc 8  hb 11.8  MCV 82  Plt 172  normal LFTs   Crea 10.6 BUN 43     s/p Librium 25 mg po in ED and Valium 10 mg IV x1  (13 Jan 2025 18:54)  REVIEW OF SYSTEMS: see cc/HPI   CONSTITUTIONAL: No weakness, fevers or chills  EYES/ENT: No visual changes;  No vertigo or throat pain   NECK: No pain or stiffness  RESPIRATORY: No cough, wheezing, hemoptysis; No shortness of breath  CARDIOVASCULAR: No chest pain or palpitations  GASTROINTESTINAL: No abdominal or epigastric pain. No nausea, vomiting, or hematemesis; No diarrhea or constipation. No melena or hematochezia.  GENITOURINARY: No dysuria, frequency or hematuria, (+) ESRD   NEUROLOGICAL: No numbness or weakness  SKIN: No itching, rashes  ENDO: No hyperglycemia, No thyroid disorder, No dyslipidemia   HEM: No bleeding, No easy bruising, No anemia   PSYCHE: No psychosis, No mood disorder No hallucinations No delusion, (+) alcohol abuse  MSK: No deformity, No fracture, No Joint swelling    Physical Exam:   General: WN/WD NAD  Neurology: A&Ox3, nonfocal, follows commands, no asterixis   Eyes: PERRLA/ EOMI  ENT/Neck: Neck supple, trachea midline, No JVD  Respiratory: CTA B/L, No wheezing, rales, rhonchi  CV: Normal rate regular rhythm, S1S2, no murmurs, rubs or gallops  Abdominal: Soft, NT, ND +BS,   Extremities: No edema, + peripheral pulses  Skin: No Rashes, Hematoma, Ecchymosis    A/p  Alcohol use disorder   Alcohol withdrawal   -c/w Detox protocol   -MVI/Folate/Thiamine   -Behavioral Jagdeep consult   -agree w/ UDS and serial CMP     ESRD   -c/w OP Rx  -Nephrology for HD Rx    Dyslipidemia / HTN  c/w OP Rx     Gout     DVT prophylaxis    PATIENT SEEN BY ATTENDING 1/13/25    60 minutes spent on review of labs, imaging studies, old records, obtaining history, personally examining patient, counselling and communicating with patient/ family, entering orders for medications/tests/etc, discussions with other health care providers, documentation in electronic health records, independent interpretation of labs, imaging/procedure results and care coordination.

## 2025-01-13 NOTE — ED PROVIDER NOTE - PROGRESS NOTE DETAILS
BF: Janis 6. Seen by SBIRT, not good candidate for outpt detox, waitlist is long for hospital based programs and pt very motivated today, so reasonable to take advantage of motivation today and admit for detox.

## 2025-01-13 NOTE — PATIENT PROFILE ADULT - FALL HARM RISK - HARM RISK INTERVENTIONS
Assistance with ambulation/Assistance OOB with selected safe patient handling equipment/Communicate Risk of Fall with Harm to all staff/Discuss with provider need for PT consult/Monitor for mental status changes/Monitor gait and stability/Provide patient with walking aids - walker, cane, crutches/Reinforce activity limits and safety measures with patient and family/Tailored Fall Risk Interventions/Toileting schedule using arm’s reach rule for commode and bathroom/Use of alarms - bed, chair and/or voice tab/Visual Cue: Yellow wristband and red socks/Bed in lowest position, wheels locked, appropriate side rails in place/Call bell, personal items and telephone in reach/Instruct patient to call for assistance before getting out of bed or chair/Non-slip footwear when patient is out of bed/[place] to call system/Physically safe environment - no spills, clutter or unnecessary equipment/Purposeful Proactive Rounding/Room/bathroom lighting operational, light cord in reach

## 2025-01-13 NOTE — SBIRT NOTE ADULT - NSSBIRTBRIEFINTDET_GEN_A_CORE
SBIRT services provided remotely. Please contact [phone] with any questions, concerns, or for additional information. Screening results were reviewed with the patient. Patient was provided educational materials on low-risk guidelines and substance use and health. Motivation and goals were discussed. Patient's substance use will be addressed during their inpatient admission.

## 2025-01-13 NOTE — SBIRT NOTE ADULT - NSSBIRTDRGPOSREINDET_GEN_A_CORE
SBIRT services provided remotely. Please contact [phone] with any questions, concerns, or for additional information. Positive reinforcement provided to patient for staying within low-risk guidelines.

## 2025-01-13 NOTE — ED ADULT TRIAGE NOTE - INTERNATIONAL TRAVEL
Was recently diagnosed 1 week ago as COVID positive and has been short of breath, power went out at home this am and daughter called for him to come to ER, not on oxygen at home
No

## 2025-01-13 NOTE — ED PROVIDER NOTE - OBJECTIVE STATEMENT
48yo domiciled Male with hx of ESRD on HD TTS (no missed sessions), gout and HTN, and PPHx of depression and alcohol use disorder who presents from outpatient facility for consideration of  detox. Pt states drinks a pint of liquor daily, last drink 12 hours prior to presentation. At [address] currently, advised to come here to initiate detox. Patient denies any history of withdrawal seizures or DTs, denies blackouts. He denies any previous enrollments in detoxes, rehabs or outpatient treatments. He denies any experience with medications for AUD.

## 2025-01-13 NOTE — ED PROVIDER NOTE - CLINICAL SUMMARY MEDICAL DECISION MAKING FREE TEXT BOX
48yo domiciled Male with hx of ESRD on HD TTS (no missed sessions), gout and HTN, and PPHx of depression and alcohol use disorder who presents from outpatient facility for consideration of  detox. Pt states drinks a pint of liquor daily, last drink 12 hours prior to presentation. At [address] currently, advised to come here to initiate detox. Patient denies any history of withdrawal seizures or DTs, denies blackouts. He denies any previous enrollments in detoxes, rehabs or outpatient treatments. He denies any experience with medications for AUD.    On exam, CIWA 9 (+1 HA, +1 agitation, +1 anxiety, +1 sweating, +4 tremors, +1 nausea). No trauma. Clear lungs. Heart RRR. Abd soft, non tender.     Given 10 mg valium for mild withdrawal, started on librium. SBIRT consulted, agree with plan for admission given HD history and motivation.     Attending Statement:  I have personally seen the patient. I provided critical care for  a total of 35 minutes. I provided a substantive portion of the care and the majority of the critical care time.

## 2025-01-13 NOTE — ED PROVIDER NOTE - PHYSICAL EXAMINATION
CONSTITUTIONAL: Well-appearing; well-nourished; in no apparent distress.   HEAD: Normocephalic; atraumatic.   EYES: PERRL; EOM intact. Conjunctiva normal B/L.   ENT: Tongue fasciculations. Normal pharynx with no tonsillar hypertrophy.  NECK: Supple; non-tender  CARDIOVASCULAR: Normal S1, S2; no murmurs, rubs, or gallops.   RESPIRATORY: Normal chest excursion with respiration; breath sounds clear and equal bilaterally; no wheezes, rhonchi, or rales.  GI/: soft, non-distended; non-tender.  BACK: No evidence of trauma or deformity. Non-tender to palpation. No CVA tenderness.   EXT: Normal ROM in all four extremities; non-tender to palpation; distal pulses are normal. No leg edema B/L.   SKIN: Normal for age and race; warm; dry  NEURO: Tremors b/l hands. Mild sweatig.

## 2025-01-13 NOTE — SBIRT NOTE ADULT - NSSBIRTAUDITSCORE_GEN_A_CORE_CAL
25 Elliptical Excision Additional Text (Leave Blank If You Do Not Want): The margin was drawn around the clinically apparent lesion.  An elliptical shape was then drawn on the skin incorporating the lesion and margins.  Incisions were then made along these lines to the appropriate tissue plane and the lesion was extirpated.

## 2025-01-13 NOTE — H&P ADULT - NSHPPHYSICALEXAM_GEN_ALL_CORE
Patient is AAOx3   Clear lungs bilaterally   Regular S1S2  Soft abdomen, non tender  No lower limb edema

## 2025-01-14 DIAGNOSIS — F10.239 ALCOHOL DEPENDENCE WITH WITHDRAWAL, UNSPECIFIED: ICD-10-CM

## 2025-01-14 DIAGNOSIS — F10.20 ALCOHOL DEPENDENCE, UNCOMPLICATED: ICD-10-CM

## 2025-01-14 DIAGNOSIS — F32.A DEPRESSION, UNSPECIFIED: ICD-10-CM

## 2025-01-14 LAB
ALBUMIN SERPL ELPH-MCNC: 4.1 G/DL — SIGNIFICANT CHANGE UP (ref 3.5–5.2)
ALP SERPL-CCNC: 72 U/L — SIGNIFICANT CHANGE UP (ref 30–115)
ALT FLD-CCNC: 11 U/L — SIGNIFICANT CHANGE UP (ref 0–41)
ANION GAP SERPL CALC-SCNC: 13 MMOL/L — SIGNIFICANT CHANGE UP (ref 7–14)
ANION GAP SERPL CALC-SCNC: 18 MMOL/L — HIGH (ref 7–14)
AST SERPL-CCNC: 14 U/L — SIGNIFICANT CHANGE UP (ref 0–41)
BASOPHILS # BLD AUTO: 0.05 K/UL — SIGNIFICANT CHANGE UP (ref 0–0.2)
BASOPHILS NFR BLD AUTO: 0.7 % — SIGNIFICANT CHANGE UP (ref 0–1)
BILIRUB SERPL-MCNC: 0.5 MG/DL — SIGNIFICANT CHANGE UP (ref 0.2–1.2)
BUN SERPL-MCNC: 46 MG/DL — HIGH (ref 10–20)
BUN SERPL-MCNC: 52 MG/DL — HIGH (ref 10–20)
CALCIUM SERPL-MCNC: 9.3 MG/DL — SIGNIFICANT CHANGE UP (ref 8.4–10.4)
CALCIUM SERPL-MCNC: 9.7 MG/DL — SIGNIFICANT CHANGE UP (ref 8.4–10.5)
CHLORIDE SERPL-SCNC: 100 MMOL/L — SIGNIFICANT CHANGE UP (ref 98–110)
CHLORIDE SERPL-SCNC: 99 MMOL/L — SIGNIFICANT CHANGE UP (ref 98–110)
CO2 SERPL-SCNC: 24 MMOL/L — SIGNIFICANT CHANGE UP (ref 17–32)
CO2 SERPL-SCNC: 29 MMOL/L — SIGNIFICANT CHANGE UP (ref 17–32)
CREAT SERPL-MCNC: 11.3 MG/DL — CRITICAL HIGH (ref 0.7–1.5)
CREAT SERPL-MCNC: 11.7 MG/DL — CRITICAL HIGH (ref 0.7–1.5)
EGFR: 5 ML/MIN/1.73M2 — LOW
EGFR: 5 ML/MIN/1.73M2 — LOW
EOSINOPHIL # BLD AUTO: 0.22 K/UL — SIGNIFICANT CHANGE UP (ref 0–0.7)
EOSINOPHIL NFR BLD AUTO: 3.2 % — SIGNIFICANT CHANGE UP (ref 0–8)
GLUCOSE SERPL-MCNC: 74 MG/DL — SIGNIFICANT CHANGE UP (ref 70–99)
GLUCOSE SERPL-MCNC: 78 MG/DL — SIGNIFICANT CHANGE UP (ref 70–99)
HCT VFR BLD CALC: 34.8 % — LOW (ref 42–52)
HGB BLD-MCNC: 11.2 G/DL — LOW (ref 14–18)
IMM GRANULOCYTES NFR BLD AUTO: 0.3 % — SIGNIFICANT CHANGE UP (ref 0.1–0.3)
IRON SATN MFR SERPL: 57 % — HIGH (ref 15–50)
IRON SATN MFR SERPL: 95 UG/DL — SIGNIFICANT CHANGE UP (ref 35–150)
LYMPHOCYTES # BLD AUTO: 2.27 K/UL — SIGNIFICANT CHANGE UP (ref 1.2–3.4)
LYMPHOCYTES # BLD AUTO: 33.1 % — SIGNIFICANT CHANGE UP (ref 20.5–51.1)
MAGNESIUM SERPL-MCNC: 2.3 MG/DL — SIGNIFICANT CHANGE UP (ref 1.8–2.4)
MCHC RBC-ENTMCNC: 26.4 PG — LOW (ref 27–31)
MCHC RBC-ENTMCNC: 32.2 G/DL — SIGNIFICANT CHANGE UP (ref 32–37)
MCV RBC AUTO: 82.1 FL — SIGNIFICANT CHANGE UP (ref 80–94)
MONOCYTES # BLD AUTO: 0.66 K/UL — HIGH (ref 0.1–0.6)
MONOCYTES NFR BLD AUTO: 9.6 % — HIGH (ref 1.7–9.3)
NEUTROPHILS # BLD AUTO: 3.64 K/UL — SIGNIFICANT CHANGE UP (ref 1.4–6.5)
NEUTROPHILS NFR BLD AUTO: 53.1 % — SIGNIFICANT CHANGE UP (ref 42.2–75.2)
NRBC # BLD: 0 /100 WBCS — SIGNIFICANT CHANGE UP (ref 0–0)
PHOSPHATE SERPL-MCNC: 7.5 MG/DL — HIGH (ref 2.1–4.9)
PLATELET # BLD AUTO: 159 K/UL — SIGNIFICANT CHANGE UP (ref 130–400)
PMV BLD: 13.2 FL — HIGH (ref 7.4–10.4)
POTASSIUM SERPL-MCNC: 4.4 MMOL/L — SIGNIFICANT CHANGE UP (ref 3.5–5)
POTASSIUM SERPL-MCNC: 4.7 MMOL/L — SIGNIFICANT CHANGE UP (ref 3.5–5)
POTASSIUM SERPL-SCNC: 4.4 MMOL/L — SIGNIFICANT CHANGE UP (ref 3.5–5)
POTASSIUM SERPL-SCNC: 4.7 MMOL/L — SIGNIFICANT CHANGE UP (ref 3.5–5)
PROT SERPL-MCNC: 6.3 G/DL — SIGNIFICANT CHANGE UP (ref 6–8)
RBC # BLD: 4.24 M/UL — LOW (ref 4.7–6.1)
RBC # FLD: 16.7 % — HIGH (ref 11.5–14.5)
SODIUM SERPL-SCNC: 141 MMOL/L — SIGNIFICANT CHANGE UP (ref 135–146)
SODIUM SERPL-SCNC: 142 MMOL/L — SIGNIFICANT CHANGE UP (ref 135–146)
TIBC SERPL-MCNC: 167 UG/DL — LOW (ref 220–430)
UIBC SERPL-MCNC: 72 UG/DL — LOW (ref 110–370)
WBC # BLD: 6.86 K/UL — SIGNIFICANT CHANGE UP (ref 4.8–10.8)
WBC # FLD AUTO: 6.86 K/UL — SIGNIFICANT CHANGE UP (ref 4.8–10.8)

## 2025-01-14 PROCEDURE — 99222 1ST HOSP IP/OBS MODERATE 55: CPT

## 2025-01-14 PROCEDURE — 99232 SBSQ HOSP IP/OBS MODERATE 35: CPT

## 2025-01-14 RX ORDER — SEVELAMER CARBONATE 800 MG/1
800 TABLET, FILM COATED ORAL
Refills: 0 | Status: DISCONTINUED | OUTPATIENT
Start: 2025-01-14 | End: 2025-01-17

## 2025-01-14 RX ORDER — CALCIUM ACETATE 667 MG/1
667 CAPSULE ORAL
Qty: 0 | Refills: 0 | DISCHARGE
Start: 2025-01-14

## 2025-01-14 RX ORDER — B COMPLEX, C NO.20/FOLIC ACID 1 MG
1 CAPSULE ORAL
Qty: 0 | Refills: 0 | DISCHARGE
Start: 2025-01-14

## 2025-01-14 RX ORDER — THIAMINE HYDROCHLORIDE 100 MG/ML
100 INJECTION, SOLUTION INTRAMUSCULAR; INTRAVENOUS DAILY
Refills: 0 | Status: DISCONTINUED | OUTPATIENT
Start: 2025-01-14 | End: 2025-01-14

## 2025-01-14 RX ORDER — THIAMINE HYDROCHLORIDE 100 MG/ML
500 INJECTION, SOLUTION INTRAMUSCULAR; INTRAVENOUS EVERY 8 HOURS
Refills: 0 | Status: DISCONTINUED | OUTPATIENT
Start: 2025-01-14 | End: 2025-01-15

## 2025-01-14 RX ORDER — VITAMIN A 10000 UNIT
1 TABLET ORAL
Qty: 0 | Refills: 0 | DISCHARGE
Start: 2025-01-14

## 2025-01-14 RX ORDER — THIAMINE HYDROCHLORIDE 100 MG/ML
100 INJECTION, SOLUTION INTRAMUSCULAR; INTRAVENOUS
Qty: 0 | Refills: 0 | DISCHARGE
Start: 2025-01-14 | End: 2025-01-17

## 2025-01-14 RX ORDER — CHLORDIAZEPOXIDE HCL 5 MG
1 CAPSULE ORAL
Qty: 0 | Refills: 0 | DISCHARGE
Start: 2025-01-14

## 2025-01-14 RX ORDER — NIFEDIPINE 60 MG/1
1 TABLET, EXTENDED RELEASE ORAL
Qty: 0 | Refills: 0 | DISCHARGE
Start: 2025-01-14

## 2025-01-14 RX ORDER — CALCIUM ACETATE 667 MG/1
667 CAPSULE ORAL
Refills: 0 | Status: DISCONTINUED | OUTPATIENT
Start: 2025-01-14 | End: 2025-01-14

## 2025-01-14 RX ADMIN — Medication 50 MILLIGRAM(S): at 21:28

## 2025-01-14 RX ADMIN — ATORVASTATIN CALCIUM 40 MILLIGRAM(S): 40 TABLET, FILM COATED ORAL at 21:28

## 2025-01-14 RX ADMIN — THIAMINE HYDROCHLORIDE 105 MILLIGRAM(S): 100 INJECTION, SOLUTION INTRAMUSCULAR; INTRAVENOUS at 13:03

## 2025-01-14 RX ADMIN — LOSARTAN POTASSIUM 100 MILLIGRAM(S): 100 TABLET, FILM COATED ORAL at 05:18

## 2025-01-14 RX ADMIN — Medication 1 TABLET(S): at 11:08

## 2025-01-14 RX ADMIN — LABETALOL HCL 200 MILLIGRAM(S): 300 TABLET, FILM COATED ORAL at 21:28

## 2025-01-14 RX ADMIN — SEVELAMER CARBONATE 800 MILLIGRAM(S): 800 TABLET, FILM COATED ORAL at 17:04

## 2025-01-14 RX ADMIN — Medication 50 MILLIGRAM(S): at 09:06

## 2025-01-14 RX ADMIN — THIAMINE HYDROCHLORIDE 100 MILLIGRAM(S): 100 INJECTION, SOLUTION INTRAMUSCULAR; INTRAVENOUS at 09:06

## 2025-01-14 RX ADMIN — NIFEDIPINE 90 MILLIGRAM(S): 60 TABLET, EXTENDED RELEASE ORAL at 05:18

## 2025-01-14 RX ADMIN — SEVELAMER CARBONATE 800 MILLIGRAM(S): 800 TABLET, FILM COATED ORAL at 14:15

## 2025-01-14 RX ADMIN — Medication 1 MILLIGRAM(S): at 11:07

## 2025-01-14 RX ADMIN — HEPARIN SODIUM 5000 UNIT(S): 1000 INJECTION, SOLUTION INTRAVENOUS; SUBCUTANEOUS at 17:05

## 2025-01-14 RX ADMIN — SODIUM CHLORIDE 40 MILLILITER(S): 9 INJECTION, SOLUTION INTRAVENOUS at 21:28

## 2025-01-14 RX ADMIN — Medication 50 MILLIGRAM(S): at 11:07

## 2025-01-14 RX ADMIN — THIAMINE HYDROCHLORIDE 105 MILLIGRAM(S): 100 INJECTION, SOLUTION INTRAMUSCULAR; INTRAVENOUS at 21:28

## 2025-01-14 RX ADMIN — ALLOPURINOL 100 MILLIGRAM(S): 100 TABLET ORAL at 11:08

## 2025-01-14 RX ADMIN — HEPARIN SODIUM 5000 UNIT(S): 1000 INJECTION, SOLUTION INTRAVENOUS; SUBCUTANEOUS at 05:18

## 2025-01-14 NOTE — CONSULT NOTE ADULT - SUBJECTIVE AND OBJECTIVE BOX
NEPHROLOGY CONSULTATION NOTE    49 year old man with hx of ESRD on HD, gout and HTN, alcohol use disorder presents for inpatient detox.   Ciwa 6. Seen by SBIRT, not good candidate for outpt detox, and pt very motivated on admission.  Seen today denied chest pain no SOB no abdominal pain no nausea no vomiting no fever no chills no other complaints     PAST MEDICAL & SURGICAL HISTORY:  HTN (hypertension)  Gout  Chronic kidney failure  Requires peritoneal dialysis  Peritoneal dialysis catheter in place        Allergies:  No Known Allergies    Home Medications Reviewed  Hospital Medications:   MEDICATIONS  (STANDING):  allopurinol 100 milliGRAM(s) Oral daily  atorvastatin 40 milliGRAM(s) Oral at bedtime  calcium acetate 667 milliGRAM(s) Oral three times a day with meals  chlordiazePOXIDE 50 milliGRAM(s) Oral every 8 hours  dextrose 5%. 1000 milliLiter(s) (40 mL/Hr) IV Continuous <Continuous>  folic acid 1 milliGRAM(s) Oral daily  heparin   Injectable 5000 Unit(s) SubCutaneous every 12 hours  labetalol 200 milliGRAM(s) Oral at bedtime  losartan 100 milliGRAM(s) Oral daily  multivitamin 1 Tablet(s) Oral daily  NIFEdipine XL 90 milliGRAM(s) Oral daily  thiamine IVPB 500 milliGRAM(s) IV Intermittent every 8 hours      SOCIAL HISTORY:  Denies ETOH,Smoking,   FAMILY HISTORY:        REVIEW OF SYSTEMS:  All other review of systems is negative unless indicated above.    VITALS:  T(F): 97.6 (01-14-25 @ 12:18), Max: 98.5 (01-13-25 @ 12:45)  HR: 76 (01-14-25 @ 12:18)  BP: 138/73 (01-14-25 @ 12:18)  RR: 18 (01-14-25 @ 12:18)  SpO2: 98% (01-14-25 @ 04:31)    Height (cm): 185.4 (01-13 @ 12:45)  Weight (kg): 97.5 (01-13 @ 12:45)  BMI (kg/m2): 28.4 (01-13 @ 12:45)  BSA (m2): 2.22 (01-13 @ 12:45)    I&O's Detail        PHYSICAL EXAM:  Constitutional: NAD  Respiratory: CTAB, no wheezes, rales or rhonchi  Cardiovascular: S1, S2, RRR  Gastrointestinal: BS+, soft, NT/ND  Extremities: No cyanosis or clubbing. No peripheral edema  Neurological: A/O x 3, no focal deficits  Psychiatric: Normal mood, normal affect  Skin: No rashes  Vascular Access:    LABS:  01-14    141  |  99  |  52[H]  ----------------------------<  78  4.4   |  24  |  11.7[HH]    Ca    9.3      14 Jan 2025 06:17  Phos  7.5     01-14  Mg     2.3     01-14    TPro  6.3  /  Alb  4.1  /  TBili  0.5  /  DBili      /  AST  14  /  ALT  11  /  AlkPhos  72  01-14    Creatinine Trend: 11.7 <--, 11.3 <--, 10.6 <--                        11.2   6.86  )-----------( 159      ( 14 Jan 2025 06:17 )             34.8     Urine Studies:  Urinalysis Basic - ( 14 Jan 2025 06:17 )    Color:  / Appearance:  / SG:  / pH:   Gluc: 78 mg/dL / Ketone:   / Bili:  / Urobili:    Blood:  / Protein:  / Nitrite:    Leuk Esterase:  / RBC:  / WBC    Sq Epi:  / Non Sq Epi:  / Bacteria:             Iron 95, TIBC 167, %sat 57      [01-14-25 @ 06:17]    HBsAg Nonreact      [08-16-21 @ 08:10]  HCV 0.18, Nonreact      [08-16-21 @ 08:10]  HIV Nonreact      [08-16-21 @ 08:10]    BIBIANA: titer 1:80, pattern Speckled      [08-16-21 @ 08:10]  dsDNA <12      [08-16-21 @ 08:10]  C3 Complement 205      [08-16-21 @ 08:10]  C4 Complement 35      [08-16-21 @ 08:10]  ANCA: cANCA Negative, pANCA Negative, atypical ANCA Negative      [08-16-21 @ 08:10]  anti-GBM 4      [08-16-21 @ 08:10]  Free Light Chains: kappa 2.68, lambda 3.74, ratio = 0.72      [08-16 @ 08:10]  Immunofixation Serum:   No Monoclonal Band Identified    Reference Range: None Detected      [08-16-21 @ 08:10]  Immunofixation Urine: Reference Range: None Detected      [08-16-21 @ 14:36]  UPEP Interpretation: Severe Selective (Glomerular) Proteinuria      [08-16-21 @ 14:36]

## 2025-01-14 NOTE — DISCHARGE NOTE PROVIDER - CARE PROVIDER_API CALL
Tonja Ramirez  Internal Medicine  [address] [place] [address]  Phone: [phone]  Fax: [phone]  Follow Up Time:

## 2025-01-14 NOTE — CONSULT NOTE ADULT - ASSESSMENT
Impression  # Alcohol detoxication  # Alcohol use disorder - interesting in quitting  # ESRD    Recommendations:  - continue current librium taper regimen  - at the end of the regimen, start daily oral naltrexone   - thiamine 500mg q8 for 3 days followed by 100mg qd  - folate and multivitamin supp  - Outpatient referral programs given to the patient   Impression  # Alcohol detoxication - CIWA 3  # Alcohol use disorder - interesting in quitting  # ESRD    Recommendations:  - continue current librium taper regimen  - at the end of the regimen, start daily oral naltrexone   - thiamine 500mg q8 for 3 days followed by 100mg qd  - folate and multivitamin supp  - Outpatient referral programs given to the patient   48yo domiciled Male with hx of ESRD on HD, gout and HTN, and PPHx of depression and alcohol use disorder who presents from outpatient facility for inpatient detox. Addiction Medicine consulted for assistance with alcohol detoxication management.       Impression  # Alcohol detoxication - CIWA 3  # Alcohol use disorder - interesting in quitting  # ESRD    Recommendations:  - continue current librium taper regimen  - at the end of the regimen, start daily oral naltrexone   - thiamine 500mg q8 for 3 days followed by 100mg qd  - folate and multivitamin supp  - Outpatient referral programs given to the patient

## 2025-01-14 NOTE — CONSULT NOTE ADULT - ATTENDING COMMENTS
I saw and evaluated the patient today 1-14-25 with resident during key/critical portions of the visit.  Nursing/primary team/consultant records reviewed. I agree with the resident's note including past psych history, home medications, social history, allergies, surgical history, family history, and review of system. I have reviewed relevant vitals, laboratory values, imaging studies, and microbiology.  I agree with the trainee's findings and assessment and plan as documented in the trainee's note.   - Above resident's note was edited by me   - agree with rest of A/P as per detailed resident note, unless noted below.     50yo domiciled Male with hx of ESRD on HD, gout and HTN, and PPHx of depression and alcohol use disorder who presents from outpatient facility for inpatient detox. Addiction Medicine consulted for assistance with alcohol detoxication management.     #Moderate Alcohol withdrawal  - agree with currently librium taper, can discontinue last day of taper to allow for 3 day taper until Thurs 1/16. Patient responding well with low CIWA this AM.   - continue CIWA monitoring for clinical s/s of alcohol withdrawal especially in the first 2-3 days post last drink for breakthrough symptoms   - keep Mg>2, K>4   - agree with high dose thiamine for 3 days given nutritional deficiency endorsed prior to admission and ocular dysfunction, continue oral folate and multivitamin  - hold benzodiazepines for sedation/respiratory depression    - please continue all PRN medications for supportive management of withdrawal including:  - hydroxyzine 25 mg q6h prn anxiety   - tylenol 650 mg q6h prn mild/moderate pain  - melatonin 3 mg nightly prn for insomnia  - CATCH team to assist with substance use aftercare options. Team evaluated inpatient options, however given patient requires dialysis, unable to locate an inpatient substance rehab facility that can provide dialysis services as well. Patient is amenable to outpatient treatment however at [facility]  medications for AUD such as naltrexone. We will discuss starting medication tomorrow with oral naltrexone.     #Depression  - patient will need formal assessment and treatment in outpatient [facility] clinic for concurring condition. Denies any active or passive SI or HI.     Thank you for this consult. Rest of care per primary. Addiction medicine will continue to follow. Please reach out with any questions or concerns via TEAMS.

## 2025-01-14 NOTE — PROGRESS NOTE ADULT - ATTENDING COMMENTS
Pt seen and examined. Case and Plan discussed at rounds and agree with resident note as reviewed.   Pt admitted for Alcohol Detox assistance (Moderate Alcohol Use Disorder).  - s/p CATCH and Addiction Medicine eval   - c/w Librium Taper   - c/w CIWA monitoring   - Per Catch pt needs to stay inpatient till Thursday per their process before d/c to Acute Rehab  - FA/Thiamine/MVI    ESRD/HTN  - Nephrology eval for HD   - Monitor BP    DVT Proph: OOB-Chair / Ambulate    [facility] - start CM/SW looking for bed    MEDICATIONS  (STANDING):  allopurinol 100 milliGRAM(s) Oral daily  atorvastatin 40 milliGRAM(s) Oral at bedtime  chlordiazePOXIDE 50 milliGRAM(s) Oral every 8 hours  chlordiazePOXIDE   Oral   dextrose 5%. 1000 milliLiter(s) (40 mL/Hr) IV Continuous <Continuous>  folic acid 1 milliGRAM(s) Oral daily  heparin   Injectable 5000 Unit(s) SubCutaneous every 12 hours  labetalol 200 milliGRAM(s) Oral at bedtime  losartan 100 milliGRAM(s) Oral daily  multivitamin 1 Tablet(s) Oral daily  NIFEdipine XL 90 milliGRAM(s) Oral daily  sevelamer carbonate 800 milliGRAM(s) Oral three times a day with meals  thiamine IVPB 500 milliGRAM(s) IV Intermittent every 8 hours    MEDICATIONS  (PRN):  chlordiazePOXIDE 25 milliGRAM(s) Oral every 1 hour PRN Symptom-triggered: each CIWA -Ar score 8 or GREATER Pt seen and examined. Case and Plan discussed at rounds and agree with resident note as reviewed.   Pt admitted for Alcohol Detox assistance (Moderate Alcohol Use Disorder).  - s/p CATCH and Addiction Medicine eval   - c/w Librium Taper   - c/w CIWA monitoring   - Per Catch pt needs to stay inpatient till Thursday per their process before d/c to Acute Rehab  - FA/Thiamine/MVI    ESRD/HTN  - HD TTS will get HD today   - Nephrology eval for HD appreciated   - Monitor BP    DVT Proph: OOB-Chair / Ambulate    [facility]po: [facility] - start CM/SW looking for bed    MEDICATIONS  (STANDING):  allopurinol 100 milliGRAM(s) Oral daily  atorvastatin 40 milliGRAM(s) Oral at bedtime  chlordiazePOXIDE 50 milliGRAM(s) Oral every 8 hours  chlordiazePOXIDE   Oral   dextrose 5%. 1000 milliLiter(s) (40 mL/Hr) IV Continuous <Continuous>  folic acid 1 milliGRAM(s) Oral daily  heparin   Injectable 5000 Unit(s) SubCutaneous every 12 hours  labetalol 200 milliGRAM(s) Oral at bedtime  losartan 100 milliGRAM(s) Oral daily  multivitamin 1 Tablet(s) Oral daily  NIFEdipine XL 90 milliGRAM(s) Oral daily  sevelamer carbonate 800 milliGRAM(s) Oral three times a day with meals  thiamine IVPB 500 milliGRAM(s) IV Intermittent every 8 hours    MEDICATIONS  (PRN):  chlordiazePOXIDE 25 milliGRAM(s) Oral every 1 hour PRN Symptom-triggered: each CIWA -Ar score 8 or GREATER

## 2025-01-14 NOTE — DISCHARGE NOTE PROVIDER - NSDCMRMEDTOKEN_GEN_ALL_CORE_FT
allopurinol 100 mg oral tablet: 1 tab(s) orally once a day  calcium acetate 667 mg oral tablet: 667 milligram(s) orally 3 times a day  chlordiazePOXIDE 25 mg oral capsule: 1 cap(s) orally every hour As needed Symptom-triggered: each CIWA -Ar score 8 or GREATER  folic acid 1 mg oral tablet: 1 tab(s) orally once a day  labetalol 200 mg oral tablet: 1 tab(s) orally once a day (at bedtime)  Lipitor 40 mg oral tablet: 1 tab(s) orally once a day (at bedtime)  losartan 100 mg oral tablet: 1 tab(s) orally once a day  Multiple Vitamins oral tablet: 1 tab(s) orally once a day  NIFEdipine 90 mg oral tablet, extended release: 1 tab(s) orally once a day  thiamine 100 mg/mL injectable solution: 100 milligram(s) injectable once a day   allopurinol 100 mg oral tablet: 1 tab(s) orally once a day  calcium acetate 667 mg oral tablet: 667 milligram(s) orally 3 times a day  chlordiazePOXIDE 25 mg oral capsule: 1 cap(s) orally every hour As needed Symptom-triggered: each CIWA -Ar score 8 or GREATER  folic acid 1 mg oral tablet: 1 tab(s) orally once a day  labetalol 200 mg oral tablet: 1 tab(s) orally once a day (at bedtime)  Lipitor 40 mg oral tablet: 1 tab(s) orally once a day (at bedtime)  losartan 100 mg oral tablet: 1 tab(s) orally once a day  Multiple Vitamins oral tablet: 1 tab(s) orally once a day  naltrexone 50 mg oral tablet: 1 tab(s) orally once a day (before a meal)  NIFEdipine 90 mg oral tablet, extended release: 1 tab(s) orally once a day  sevelamer carbonate 800 mg oral tablet: 1 tab(s) orally 3 times a day (with meals)  thiamine 100 mg/mL injectable solution: 100 milligram(s) injectable once a day

## 2025-01-14 NOTE — DISCHARGE NOTE PROVIDER - NSDCFUSCHEDAPPT_GEN_ALL_CORE_FT
Jose Guadalupe Taylor  [facility] Pre[facility]mits  Scheduled Appointment: 01/21/2025    [facility]  PSYCHIATRY  [address]  Scheduled Appointment: 01/21/2025    Liliane Osorio  [facility]  Scheduled Appointment: 01/24/2025    [facility]  PSYCHIATRY [facility] [address]  Scheduled Appointment: 01/24/2025

## 2025-01-14 NOTE — DISCHARGE NOTE PROVIDER - HOSPITAL COURSE
49 year old man with hx of ESRD on HD, gout and HTN, alcohol use disorder presents for inpatient detox.   [place] [address]. Seen by [facility], not good candidate for outpt detox, and pt very motivated today, so reasonable to take advantage of motivation today and admit for detox.    In ED:  vitals within normal range   wbc 8  hb 11.8  MCV 82  Plt 172  normal LFTs   Crea 10.6 BUN 43     s/p Librium 25 mg po in ED and Vlium 10 mg IV x1     Hospital Course:  #Detox  #Alcohol withdrawal  -patient is motivated to start management  -last drink was last night at 11 pm. He drinks Vodka everyday.    -start folate, thiamine and multivitamins  -D5 at 40 ml/hr - check FS BID  -check vitamins level  -[facility] protocol standing and as needed  -Addiction med consult  -check urine drug screen   -CMP, Mg, P in am     #HTN  #Gout  #ESRD on HD   -patient on HD, TTS, consult nephro   -c/w Labetalol 200, Nifedipine 90, and Losartan 100  -c/w Allopurinol 100 OD and Lipitor 40 qhs     49 year old man with hx of ESRD on HD, gout and HTN, alcohol use disorder presents for inpatient detox.   [place] [address]. Seen by [facility], not good candidate for outpt detox, and pt very motivated today, so reasonable to take advantage of motivation today and admit for detox.    In ED:  vitals within normal range   wbc 8  hb 11.8  MCV 82  Plt 172  normal LFTs   Crea 10.6 BUN 43     s/p Librium 25 mg po in ED and Vlium 10 mg IV x1     Hospital Course:  #Detox  #Alcohol withdrawal  -patient is motivated to start management  -last drink was last night at 11 pm. He drinks Vodka everyday.    -start folate, thiamine and multivitamins  -D5 at 40 ml/hr - check FS BID  -check vitamins level  -[facility] protocol standing and as needed  -Addiction med consult -> will finish librium taper and started on Naltrexone 50 Daily for AUD will follow up outpatient   -check urine drug screen   -CMP, Mg, P in am     #HTN  #Gout  #ESRD on HD   -patient on HD, TTS, consulted nephro   -c/w Labetalol 200, Nifedipine 90, and Losartan 100  -c/w Allopurinol 100 OD and Lipitor 40 qhs

## 2025-01-14 NOTE — CONSULT NOTE ADULT - SUBJECTIVE AND OBJECTIVE BOX
Pt interviewed, examined and EMR chart reviewed.    49yMale with hx of ESRD on HD, gout and HTN, alcohol use disorder presents from outpatient facility for inpatient detox.   Addiction Medicine consulte for assistance with alcohol detoxication managment.   Patient reports that he drinks a pint of vodka daily for >20 years. last drink 2 days ago. he presented to the outpatient rehab facility across the street who sent him for inpatient detox. he is motivated to quitting alcohol and is asking for help doing so.   Patient denies benzodiazepines, cocaine and other amphetamines, cannabinoids, ectasy/crystal, GHB, ketamine, PCP, inhalants or misue of other illicit substances, supplements or prescriptions/medications.   He has history of depression 2 months ago when he first started requiring HD and he distanced himself from his family, did not take any medications or see a psychiatrist/psychologist. He denies attempting to harm himself at that time. The only time he had a plan for suicide was in his teen years when he almost shot himself but stopped himself last minute.  Today he reports feeling well on the librium taper, he denies any headache, blurry vision, hallucinations. He has no nausea/vomiting/abd pain.   he denies any suicidal ideations or plans.   He is interesting in starting daily naltrexone tabs to help with his cravings.     Patient denies increased anxiety, depression, SI/HI, AH/VH.     [facility] ISTOP: 1/14/2025: This report was requested by: Ilene Dutta | Reference #: [id number]  There are no results for the search terms that you entered.     SOCIAL HISTORY: lives home with his family.    REVIEW OF SYSTEMS: per HPI     MEDICATIONS  (STANDING):  allopurinol 100 milliGRAM(s) Oral daily  atorvastatin 40 milliGRAM(s) Oral at bedtime  calcium acetate 667 milliGRAM(s) Oral three times a day with meals  chlordiazePOXIDE 50 milliGRAM(s) Oral every 6 hours  chlordiazePOXIDE 50 milliGRAM(s) Oral every 8 hours  chlordiazePOXIDE   Oral   dextrose 5%. 1000 milliLiter(s) (40 mL/Hr) IV Continuous <Continuous>  folic acid 1 milliGRAM(s) Oral daily  heparin   Injectable 5000 Unit(s) SubCutaneous every 12 hours  labetalol 200 milliGRAM(s) Oral at bedtime  losartan 100 milliGRAM(s) Oral daily  multivitamin 1 Tablet(s) Oral daily  NIFEdipine XL 90 milliGRAM(s) Oral daily  thiamine Injectable 100 milliGRAM(s) IV Push daily    MEDICATIONS  (PRN):  chlordiazePOXIDE 25 milliGRAM(s) Oral every 1 hour PRN Symptom-triggered: each CIWA -Ar score 8 or GREATER      Vital Signs Last 24 Hrs  T(C): 36.7 (14 Jan 2025 04:31), Max: 36.9 (13 Jan 2025 12:45)  T(F): 98.1 (14 Jan 2025 04:31), Max: 98.5 (13 Jan 2025 12:45)  HR: 82 (14 Jan 2025 04:31) (82 - 98)  BP: 152/91 (14 Jan 2025 04:31) (133/86 - 164/92)  BP(mean): 111 (14 Jan 2025 04:31) (111 - 116)  RR: 18 (14 Jan 2025 04:31) (18 - 18)  SpO2: 98% (14 Jan 2025 04:31) (98% - 100%)    Parameters below as of 14 Jan 2025 04:31  Patient On (Oxygen Delivery Method): room air        PHYSICAL EXAM:    Constitutional: NAD, well-groomed, well-developed  HEENT: PERRLA, EOMI. no nystagmus. mild tongue fasciculations.   Respiratory: no increased work of breathing  Extremities: +ve bilateral hand tremors.   Neurological: A/O x 3, no focal deficits    MENTAL STATUS EXAM:  Appearance: calm, in hospital attire   Appearance in relation to age: looks stated age      Hygiene/Grooming: fair grooming   Attitude toward examiner: fully cooperative  Alertness: alert  Orientation: oriented to time, place and person  Posture: sitting in bed  Gait: not evaluated  Behavior and psychomotor activity: normal  Mood: euthymic  Affect: full range and reactivity      Speech: fluent and spontaneous; normal rate, rhythm, volume and tone   Perceptions: denies hallucinations  Thought process: logical, linear and goal-directed    Thought content: no delusions or particular preoccupation, denies SI/HI  Associations: no loosening of associations   Impulse Control: aware of socially acceptable behavior  Insight: aware of need to change behaviors to remain sober  Judgment: understands consequences of alcohol use    LABS:                        11.2   6.86  )-----------( 159      ( 14 Jan 2025 06:17 )             34.8     01-14    141  |  99  |  52[H]  ----------------------------<  78  4.4   |  24  |  11.7[HH]    Ca    9.3      14 Jan 2025 06:17  Phos  7.5     01-14  Mg     2.3     01-14    TPro  6.3  /  Alb  4.1  /  TBili  0.5  /  DBili  x   /  AST  14  /  ALT  11  /  AlkPhos  72  01-14      Urinalysis Basic - ( 14 Jan 2025 06:17 )    Color: x / Appearance: x / SG: x / pH: x  Gluc: 78 mg/dL / Ketone: x  / Bili: x / Urobili: x   Blood: x / Protein: x / Nitrite: x   Leuk Esterase: x / RBC: x / WBC x   Sq Epi: x / Non Sq Epi: x / Bacteria: x      Drug Screen Urine: pending  Alcohol Level: pending       Pt interviewed, examined and EMR chart reviewed.    49yMale with hx of ESRD on HD, gout and HTN, alcohol use disorder presents from outpatient facility for inpatient detox.   Addiction Medicine consulted for assistance with alcohol detoxication management   Patient reports that he drinks a pint of vodka daily for >20 years. last drink 2 days ago. he presented to the outpatient rehab facility across the street who sent him for inpatient detox. he is motivated to quitting alcohol and is asking for help doing so.   Patient denies benzodiazepines, cocaine and other amphetamines, cannabinoids, ectasy/crystal, GHB, ketamine, PCP, inhalants or misuse of other illicit substances, supplements or prescriptions/medications.   He has history of depression 2 months ago when he first started requiring HD and he distanced himself from his family, did not take any medications or see a psychiatrist/psychologist. He denies attempting to harm himself at that time. The only time he had a plan for suicide was in his teen years when he almost shot himself but stopped himself last minute.  Today he reports feeling well on the librium taper, he denies any headache, blurry vision, hallucinations. He has no nausea/vomiting/abd pain.   he denies any suicidal ideations or plans.   He is interesting in starting daily naltrexone tabs to help with his cravings.     Patient denies increased anxiety, depression, SI/HI, AH/VH.     [facility] ISTOP: 1/14/2025: This report was requested by: Ilene Dutta | Reference #: [id number]  There are no results for the search terms that you entered.     SOCIAL HISTORY: lives home with his family. unemployed now on disability.    REVIEW OF SYSTEMS: per HPI     MEDICATIONS  (STANDING):  allopurinol 100 milliGRAM(s) Oral daily  atorvastatin 40 milliGRAM(s) Oral at bedtime  calcium acetate 667 milliGRAM(s) Oral three times a day with meals  chlordiazePOXIDE 50 milliGRAM(s) Oral every 6 hours  chlordiazePOXIDE 50 milliGRAM(s) Oral every 8 hours  chlordiazePOXIDE   Oral   dextrose 5%. 1000 milliLiter(s) (40 mL/Hr) IV Continuous <Continuous>  folic acid 1 milliGRAM(s) Oral daily  heparin   Injectable 5000 Unit(s) SubCutaneous every 12 hours  labetalol 200 milliGRAM(s) Oral at bedtime  losartan 100 milliGRAM(s) Oral daily  multivitamin 1 Tablet(s) Oral daily  NIFEdipine XL 90 milliGRAM(s) Oral daily  thiamine Injectable 100 milliGRAM(s) IV Push daily    MEDICATIONS  (PRN):  chlordiazePOXIDE 25 milliGRAM(s) Oral every 1 hour PRN Symptom-triggered: each CIWA -Ar score 8 or GREATER      Vital Signs Last 24 Hrs  T(C): 36.7 (14 Jan 2025 04:31), Max: 36.9 (13 Jan 2025 12:45)  T(F): 98.1 (14 Jan 2025 04:31), Max: 98.5 (13 Jan 2025 12:45)  HR: 82 (14 Jan 2025 04:31) (82 - 98)  BP: 152/91 (14 Jan 2025 04:31) (133/86 - 164/92)  BP(mean): 111 (14 Jan 2025 04:31) (111 - 116)  RR: 18 (14 Jan 2025 04:31) (18 - 18)  SpO2: 98% (14 Jan 2025 04:31) (98% - 100%)    Parameters below as of 14 Jan 2025 04:31  Patient On (Oxygen Delivery Method): room air        PHYSICAL EXAM:    Constitutional: NAD, well-groomed, well-developed  HEENT: PERRLA, EOMI. no nystagmus. mild tongue fasciculations.   Respiratory: no increased work of breathing  Extremities: +ve bilateral hand tremors.   Neurological: A/O x 3, no focal deficits    MENTAL STATUS EXAM:  Appearance: calm, in hospital attire   Appearance in relation to age: looks stated age      Hygiene/Grooming: fair grooming   Attitude toward examiner: fully cooperative  Alertness: alert  Orientation: oriented to time, place and person  Posture: sitting in bed  Gait: not evaluated  Behavior and psychomotor activity: normal  Mood: euthymic  Affect: full range and reactivity      Speech: fluent and spontaneous; normal rate, rhythm, volume and tone   Perceptions: denies hallucinations  Thought process: logical, linear and goal-directed    Thought content: no delusions or particular preoccupation, denies SI/HI  Associations: no loosening of associations   Impulse Control: aware of socially acceptable behavior  Insight: aware of need to change behaviors to remain sober  Judgment: understands consequences of alcohol use    LABS:                        11.2   6.86  )-----------( 159      ( 14 Jan 2025 06:17 )             34.8     01-14    141  |  99  |  52[H]  ----------------------------<  78  4.4   |  24  |  11.7[HH]    Ca    9.3      14 Jan 2025 06:17  Phos  7.5     01-14  Mg     2.3     01-14    TPro  6.3  /  Alb  4.1  /  TBili  0.5  /  DBili  x   /  AST  14  /  ALT  11  /  AlkPhos  72  01-14      Urinalysis Basic - ( 14 Jan 2025 06:17 )    Color: x / Appearance: x / SG: x / pH: x  Gluc: 78 mg/dL / Ketone: x  / Bili: x / Urobili: x   Blood: x / Protein: x / Nitrite: x   Leuk Esterase: x / RBC: x / WBC x   Sq Epi: x / Non Sq Epi: x / Bacteria: x      Drug Screen Urine: pending  Alcohol Level: pending       Pt interviewed, examined and EMR chart reviewed.    50yo domiciled Male with hx of ESRD on HD, gout and HTN, and PPHx of depression and alcohol use disorder who presents from outpatient facility for inpatient detox. Addiction Medicine consulted for assistance with alcohol detoxication management.     Patient reports that he drinks a pint of vodka daily for >20 years. last drink 2 days ago. He presented to the outpatient substance and behavioral clinic across the street who sent him for inpatient detox. He is motivated to quitting alcohol and is asking for help doing so. Patient denies any history of withdrawal seizures or DTs, denies blackouts. He denies any previous enrollments in detoxes, rehabs or outpatient treatments. He denies any experience with medications for AUD.     Patient denies benzodiazepines, cocaine and other amphetamines, cannabinoids, ectasy/crystal, GHB, ketamine, PCP, inhalants or misuse of other illicit substances, supplements or prescriptions/medications.     Patient has history of depression, and felt more depressed 2 months ago when he first started requiring HD and he distanced himself from his family, did not take any medications or see a psychiatrist/psychologist. He denies attempting to harm himself at that time. The only time he had a plan for suicide was in his teen years when he almost shot himself but stopped himself last minute. Patient currently denies increased anxiety, depression, SI/HI, AH/VH. Patient states that his alcohol use is causing a strain on his relationship with his wife and family and is willing to engage in services.     Today he reports feeling well on the librium taper, he denies any headache, blurry vision, hallucinations. He has no nausea/vomiting/abd pain. CIWA 3 for tremors.   he denies any suicidal ideations or plans.   Patient was explained MAT for AUD treatment options. Patient is interested in starting daily naltrexone tabs to help with his cravings. Patient was advised we will recommend them be started tomorrow.       [facility] ISTOP: 1/14/2025: This report was requested by: Ilene Dutta | Reference #: [id number]  There are no results for the search terms that you entered.     SOCIAL HISTORY: lives home with his family. unemployed now on disability.    REVIEW OF SYSTEMS: per HPI     MEDICATIONS  (STANDING):  allopurinol 100 milliGRAM(s) Oral daily  atorvastatin 40 milliGRAM(s) Oral at bedtime  calcium acetate 667 milliGRAM(s) Oral three times a day with meals  chlordiazePOXIDE 50 milliGRAM(s) Oral every 6 hours  chlordiazePOXIDE 50 milliGRAM(s) Oral every 8 hours  chlordiazePOXIDE   Oral   dextrose 5%. 1000 milliLiter(s) (40 mL/Hr) IV Continuous <Continuous>  folic acid 1 milliGRAM(s) Oral daily  heparin   Injectable 5000 Unit(s) SubCutaneous every 12 hours  labetalol 200 milliGRAM(s) Oral at bedtime  losartan 100 milliGRAM(s) Oral daily  multivitamin 1 Tablet(s) Oral daily  NIFEdipine XL 90 milliGRAM(s) Oral daily  thiamine Injectable 100 milliGRAM(s) IV Push daily    MEDICATIONS  (PRN):  chlordiazePOXIDE 25 milliGRAM(s) Oral every 1 hour PRN Symptom-triggered: each CIWA -Ar score 8 or GREATER      Vital Signs Last 24 Hrs  T(C): 36.7 (14 Jan 2025 04:31), Max: 36.9 (13 Jan 2025 12:45)  T(F): 98.1 (14 Jan 2025 04:31), Max: 98.5 (13 Jan 2025 12:45)  HR: 82 (14 Jan 2025 04:31) (82 - 98)  BP: 152/91 (14 Jan 2025 04:31) (133/86 - 164/92)  BP(mean): 111 (14 Jan 2025 04:31) (111 - 116)  RR: 18 (14 Jan 2025 04:31) (18 - 18)  SpO2: 98% (14 Jan 2025 04:31) (98% - 100%)    Parameters below as of 14 Jan 2025 04:31  Patient On (Oxygen Delivery Method): room air        PHYSICAL EXAM:    Constitutional: NAD, well-groomed, well-developed  HEENT: PERRLA, EOMI. no nystagmus. mild tongue fasciculations.   Respiratory: no increased work of breathing  Extremities: +ve bilateral hand tremors.   Neurological: A/O x 3, no focal deficits    MENTAL STATUS EXAM:  Appearance: calm, in hospital attire   Appearance in relation to age: looks stated age      Hygiene/Grooming: fair grooming   Attitude toward examiner: fully cooperative  Alertness: alert  Orientation: oriented to time, place and person  Posture: sitting in bed  Gait: not evaluated  Behavior and psychomotor activity: normal  Mood: euthymic  Affect: full range and reactivity      Speech: fluent and spontaneous; normal rate, rhythm, volume and tone   Perceptions: denies hallucinations  Thought process: logical, linear and goal-directed    Thought content: no delusions or particular preoccupation, denies SI/HI  Associations: no loosening of associations   Impulse Control: aware of socially acceptable behavior  Insight: aware of need to change behaviors to remain sober  Judgment: understands consequences of alcohol use    LABS:                        11.2   6.86  )-----------( 159      ( 14 Jan 2025 06:17 )             34.8     01-14    141  |  99  |  52[H]  ----------------------------<  78  4.4   |  24  |  11.7[HH]    Ca    9.3      14 Jan 2025 06:17  Phos  7.5     01-14  Mg     2.3     01-14    TPro  6.3  /  Alb  4.1  /  TBili  0.5  /  DBili  x   /  AST  14  /  ALT  11  /  AlkPhos  72  01-14      Urinalysis Basic - ( 14 Jan 2025 06:17 )    Color: x / Appearance: x / SG: x / pH: x  Gluc: 78 mg/dL / Ketone: x  / Bili: x / Urobili: x   Blood: x / Protein: x / Nitrite: x   Leuk Esterase: x / RBC: x / WBC x   Sq Epi: x / Non Sq Epi: x / Bacteria: x      Drug Screen Urine: pending  Alcohol Level: pending

## 2025-01-14 NOTE — DISCHARGE NOTE PROVIDER - NSDCCPCAREPLAN_GEN_ALL_CORE_FT
PRINCIPAL DISCHARGE DIAGNOSIS  Diagnosis: Alcohol dependence with withdrawal  Assessment and Plan of Treatment: You are being discharged today after treatment for alcohol withdrawal. It's important to continue your recovery at rehab Make sure you take all your medications – labetalol, nifedipine, losartan, allopurinol, atorvastatin, thiamine, folate, and a multivitamin – exactly as prescribed, and don't stop taking them without talking to your doctor. Schedule and keep follow-up appointments with your primary care doctor, your nephrologist, and especially your addiction medicine specialist or counselor.     PRINCIPAL DISCHARGE DIAGNOSIS  Diagnosis: Alcohol dependence with withdrawal  Assessment and Plan of Treatment: You are being discharged today after treatment for alcohol withdrawal. It's important to continue your recovery by following with outpatient services  Make sure you take all your medications – labetalol, nifedipine, losartan, allopurinol, atorvastatin, thiamine, folate, and a multivitamin – exactly as prescribed, and don't stop taking them without talking to your doctor. Schedule and keep follow-up appointments with your primary care doctor, your nephrologist, and especially your addiction medicine specialist or counselor. You were started on Naltrexone 50mg once a day make sure you take it daily to help with cravings

## 2025-01-14 NOTE — CONSULT NOTE ADULT - ASSESSMENT
49 year old man with hx of ESRD on HD, gout and HTN, alcohol use disorder presents for inpatient detox.     # ESRD on HD  # ETOH abuse disorder on detox     - HD today 3h opti 160 UF 3l as tolerated   - IP and Ca noted start sevelamer carbonate one tab po qac   - Hb noted no MATT   - detox /addiction notes appreciated     will follow

## 2025-01-15 LAB
ANION GAP SERPL CALC-SCNC: 18 MMOL/L — HIGH (ref 7–14)
BUN SERPL-MCNC: 57 MG/DL — HIGH (ref 10–20)
CALCIUM SERPL-MCNC: 9.1 MG/DL — SIGNIFICANT CHANGE UP (ref 8.4–10.5)
CHLORIDE SERPL-SCNC: 101 MMOL/L — SIGNIFICANT CHANGE UP (ref 98–110)
CO2 SERPL-SCNC: 22 MMOL/L — SIGNIFICANT CHANGE UP (ref 17–32)
CREAT SERPL-MCNC: 12.7 MG/DL — CRITICAL HIGH (ref 0.7–1.5)
EGFR: 4 ML/MIN/1.73M2 — LOW
FERRITIN SERPL-MCNC: 1605 NG/ML — HIGH (ref 30–400)
FOLATE SERPL-MCNC: 4.2 NG/ML — LOW
GLUCOSE SERPL-MCNC: 79 MG/DL — SIGNIFICANT CHANGE UP (ref 70–99)
MAGNESIUM SERPL-MCNC: 2.2 MG/DL — SIGNIFICANT CHANGE UP (ref 1.8–2.4)
PCP SPEC-MCNC: SIGNIFICANT CHANGE UP
PHOSPHATE SERPL-MCNC: 7.2 MG/DL — HIGH (ref 2.1–4.9)
POTASSIUM SERPL-MCNC: 4.3 MMOL/L — SIGNIFICANT CHANGE UP (ref 3.5–5)
POTASSIUM SERPL-SCNC: 4.3 MMOL/L — SIGNIFICANT CHANGE UP (ref 3.5–5)
SODIUM SERPL-SCNC: 141 MMOL/L — SIGNIFICANT CHANGE UP (ref 135–146)
VIT B12 SERPL-MCNC: 277 PG/ML — SIGNIFICANT CHANGE UP (ref 232–1245)

## 2025-01-15 PROCEDURE — 99232 SBSQ HOSP IP/OBS MODERATE 35: CPT

## 2025-01-15 RX ORDER — CHLORHEXIDINE GLUCONATE 1.2 MG/ML
1 RINSE ORAL
Refills: 0 | Status: DISCONTINUED | OUTPATIENT
Start: 2025-01-15 | End: 2025-01-17

## 2025-01-15 RX ORDER — NALTREXONE HYDROCHLORIDE 50 MG/1
50 TABLET, FILM COATED ORAL
Refills: 0 | Status: DISCONTINUED | OUTPATIENT
Start: 2025-01-15 | End: 2025-01-17

## 2025-01-15 RX ADMIN — HEPARIN SODIUM 5000 UNIT(S): 1000 INJECTION, SOLUTION INTRAVENOUS; SUBCUTANEOUS at 17:52

## 2025-01-15 RX ADMIN — Medication 1 MILLIGRAM(S): at 11:17

## 2025-01-15 RX ADMIN — CHLORHEXIDINE GLUCONATE 1 APPLICATION(S): 1.2 RINSE ORAL at 11:18

## 2025-01-15 RX ADMIN — SEVELAMER CARBONATE 800 MILLIGRAM(S): 800 TABLET, FILM COATED ORAL at 08:20

## 2025-01-15 RX ADMIN — SODIUM CHLORIDE 40 MILLILITER(S): 9 INJECTION, SOLUTION INTRAVENOUS at 11:17

## 2025-01-15 RX ADMIN — ALLOPURINOL 100 MILLIGRAM(S): 100 TABLET ORAL at 11:17

## 2025-01-15 RX ADMIN — THIAMINE HYDROCHLORIDE 105 MILLIGRAM(S): 100 INJECTION, SOLUTION INTRAMUSCULAR; INTRAVENOUS at 06:00

## 2025-01-15 RX ADMIN — SEVELAMER CARBONATE 800 MILLIGRAM(S): 800 TABLET, FILM COATED ORAL at 13:08

## 2025-01-15 RX ADMIN — HEPARIN SODIUM 5000 UNIT(S): 1000 INJECTION, SOLUTION INTRAVENOUS; SUBCUTANEOUS at 05:57

## 2025-01-15 RX ADMIN — SEVELAMER CARBONATE 800 MILLIGRAM(S): 800 TABLET, FILM COATED ORAL at 17:53

## 2025-01-15 RX ADMIN — Medication 50 MILLIGRAM(S): at 05:58

## 2025-01-15 RX ADMIN — NIFEDIPINE 90 MILLIGRAM(S): 60 TABLET, EXTENDED RELEASE ORAL at 05:57

## 2025-01-15 RX ADMIN — LOSARTAN POTASSIUM 100 MILLIGRAM(S): 100 TABLET, FILM COATED ORAL at 05:57

## 2025-01-15 RX ADMIN — THIAMINE HYDROCHLORIDE 105 MILLIGRAM(S): 100 INJECTION, SOLUTION INTRAMUSCULAR; INTRAVENOUS at 13:08

## 2025-01-15 RX ADMIN — Medication 50 MILLIGRAM(S): at 13:08

## 2025-01-15 RX ADMIN — ATORVASTATIN CALCIUM 40 MILLIGRAM(S): 40 TABLET, FILM COATED ORAL at 21:48

## 2025-01-15 RX ADMIN — LABETALOL HCL 200 MILLIGRAM(S): 300 TABLET, FILM COATED ORAL at 21:48

## 2025-01-15 RX ADMIN — Medication 1 TABLET(S): at 11:17

## 2025-01-16 LAB — MRSA PCR RESULT.: NEGATIVE — SIGNIFICANT CHANGE UP

## 2025-01-16 PROCEDURE — 99232 SBSQ HOSP IP/OBS MODERATE 35: CPT

## 2025-01-16 RX ORDER — NALTREXONE HYDROCHLORIDE 50 MG/1
1 TABLET, FILM COATED ORAL
Qty: 30 | Refills: 3
Start: 2025-01-16

## 2025-01-16 RX ORDER — SEVELAMER CARBONATE 800 MG/1
1 TABLET, FILM COATED ORAL
Qty: 30 | Refills: 0
Start: 2025-01-16

## 2025-01-16 RX ADMIN — LOSARTAN POTASSIUM 100 MILLIGRAM(S): 100 TABLET, FILM COATED ORAL at 05:23

## 2025-01-16 RX ADMIN — Medication 1 MILLIGRAM(S): at 16:01

## 2025-01-16 RX ADMIN — LABETALOL HCL 200 MILLIGRAM(S): 300 TABLET, FILM COATED ORAL at 21:46

## 2025-01-16 RX ADMIN — ALLOPURINOL 100 MILLIGRAM(S): 100 TABLET ORAL at 16:03

## 2025-01-16 RX ADMIN — SEVELAMER CARBONATE 800 MILLIGRAM(S): 800 TABLET, FILM COATED ORAL at 16:01

## 2025-01-16 RX ADMIN — NALTREXONE HYDROCHLORIDE 50 MILLIGRAM(S): 50 TABLET, FILM COATED ORAL at 07:40

## 2025-01-16 RX ADMIN — SEVELAMER CARBONATE 800 MILLIGRAM(S): 800 TABLET, FILM COATED ORAL at 07:39

## 2025-01-16 RX ADMIN — NIFEDIPINE 90 MILLIGRAM(S): 60 TABLET, EXTENDED RELEASE ORAL at 05:24

## 2025-01-16 RX ADMIN — Medication 50 MILLIGRAM(S): at 17:17

## 2025-01-16 RX ADMIN — HEPARIN SODIUM 5000 UNIT(S): 1000 INJECTION, SOLUTION INTRAVENOUS; SUBCUTANEOUS at 05:23

## 2025-01-16 RX ADMIN — Medication 50 MILLIGRAM(S): at 05:24

## 2025-01-16 RX ADMIN — ATORVASTATIN CALCIUM 40 MILLIGRAM(S): 40 TABLET, FILM COATED ORAL at 21:47

## 2025-01-16 RX ADMIN — HEPARIN SODIUM 5000 UNIT(S): 1000 INJECTION, SOLUTION INTRAVENOUS; SUBCUTANEOUS at 17:17

## 2025-01-16 RX ADMIN — CHLORHEXIDINE GLUCONATE 1 APPLICATION(S): 1.2 RINSE ORAL at 05:24

## 2025-01-16 RX ADMIN — Medication 1 TABLET(S): at 16:03

## 2025-01-17 ENCOUNTER — TRANSCRIPTION ENCOUNTER (OUTPATIENT)
Age: 50
End: 2025-01-17

## 2025-01-17 VITALS — OXYGEN SATURATION: 97 %

## 2025-01-17 LAB
CA-I BLD-SCNC: 4.6 MG/DL — SIGNIFICANT CHANGE UP (ref 4.5–5.6)
GLUCOSE BLDC GLUCOMTR-MCNC: 87 MG/DL — SIGNIFICANT CHANGE UP (ref 70–99)
GLUCOSE BLDC GLUCOMTR-MCNC: 94 MG/DL — SIGNIFICANT CHANGE UP (ref 70–99)

## 2025-01-17 PROCEDURE — 99239 HOSP IP/OBS DSCHRG MGMT >30: CPT

## 2025-01-17 RX ORDER — VITAMIN A 10000 UNIT
1 TABLET ORAL
Qty: 30 | Refills: 0
Start: 2025-01-17 | End: 2025-02-15

## 2025-01-17 RX ORDER — THIAMINE HYDROCHLORIDE 100 MG/ML
1 INJECTION, SOLUTION INTRAMUSCULAR; INTRAVENOUS
Qty: 30 | Refills: 0
Start: 2025-01-17 | End: 2025-02-15

## 2025-01-17 RX ORDER — B COMPLEX, C NO.20/FOLIC ACID 1 MG
1 CAPSULE ORAL
Qty: 30 | Refills: 0
Start: 2025-01-17 | End: 2025-02-15

## 2025-01-17 RX ORDER — NALTREXONE HYDROCHLORIDE 50 MG/1
1 TABLET, FILM COATED ORAL
Qty: 30 | Refills: 3
Start: 2025-01-17

## 2025-01-17 RX ADMIN — SEVELAMER CARBONATE 800 MILLIGRAM(S): 800 TABLET, FILM COATED ORAL at 11:52

## 2025-01-17 RX ADMIN — SEVELAMER CARBONATE 800 MILLIGRAM(S): 800 TABLET, FILM COATED ORAL at 08:39

## 2025-01-17 RX ADMIN — LOSARTAN POTASSIUM 100 MILLIGRAM(S): 100 TABLET, FILM COATED ORAL at 06:27

## 2025-01-17 RX ADMIN — CHLORHEXIDINE GLUCONATE 1 APPLICATION(S): 1.2 RINSE ORAL at 06:27

## 2025-01-17 RX ADMIN — Medication 1 MILLIGRAM(S): at 13:17

## 2025-01-17 RX ADMIN — NALTREXONE HYDROCHLORIDE 50 MILLIGRAM(S): 50 TABLET, FILM COATED ORAL at 08:39

## 2025-01-17 RX ADMIN — NIFEDIPINE 90 MILLIGRAM(S): 60 TABLET, EXTENDED RELEASE ORAL at 06:27

## 2025-01-17 RX ADMIN — HEPARIN SODIUM 5000 UNIT(S): 1000 INJECTION, SOLUTION INTRAVENOUS; SUBCUTANEOUS at 06:26

## 2025-01-17 RX ADMIN — Medication 1 TABLET(S): at 11:52

## 2025-01-17 RX ADMIN — ALLOPURINOL 100 MILLIGRAM(S): 100 TABLET ORAL at 11:52

## 2025-01-17 NOTE — PROGRESS NOTE ADULT - PROVIDER SPECIALTY LIST ADULT
Addiction Medicine
Internal Medicine
Nephrology
Internal Medicine
Internal Medicine
Nephrology
Addiction Medicine
Internal Medicine
Internal Medicine
Nephrology
Internal Medicine

## 2025-01-17 NOTE — DISCHARGE NOTE NURSING/CASE MANAGEMENT/SOCIAL WORK - FINANCIAL ASSISTANCE
[facility] provides services at a reduced cost to those who are determined to be eligible through [facility]’s financial assistance program. Information regarding [facility]’s financial assistance program can be found by going to [email or web address] or by calling [phone].

## 2025-01-17 NOTE — PROGRESS NOTE ADULT - SUBJECTIVE AND OBJECTIVE BOX
JEOVANY HANSON  49y  Male  ***My note supersedes ALL resident notes that I sign.  My corrections for their notes are in my note.***    I can be reached directly via Teams or my office number is [phone] or [phone].    INTERVAL EVENTS: Here for f/u of EtOH w/drawal. Pt doing well today. Librium finishes Fri. Pt eats/drinks. Pt will need OUTPT alcohol rehab because he is on HD - he is aware that he cannot do INPT EtOH rehab.    T(F): 97.6 (01-15-25 @ 11:50), Max: 98 (01-15-25 @ 04:33)  HR: 81 (01-15-25 @ 11:50) (70 - 81)  BP: 127/79 (01-15-25 @ 11:50) (127/79 - 167/83)  RR: 18 (01-15-25 @ 11:50) (18 - 18)  SpO2: 100% (01-15-25 @ 04:33) (99% - 100%)    Gen: NAD  HEENT: PERRL, EOMI, mouth clr, nose clr  Neck: no nodes, no JVD, thyroid nl  lungs: clr  hrt: s1 s2 rrr no murmur  abd: soft, NT/ND, no HS megaly  ext: no edema, no c/c  neuro: aa ox3, cn intact, can move all 4 ext    LABS:                      11.2    (    82.1   6.86  )-----------( ---------      159      ( 14 Jan 2025 06:17 )             34.8    (    16.7     141   (   101   (   79      01-15-25 @ 06:06  ----------------------               4.3   (   22   (   57                             -----                        12.7  Ca  9.1   Mg  2.2    P   7.2     Urinalysis Basic - ( 15 Douglas 2025 06:06 )    Color: x / Appearance: x / SG: x / pH: x  Gluc: 79 mg/dL / Ketone: x  / Bili: x / Urobili: x   Blood: x / Protein: x / Nitrite: x   Leuk Esterase: x / RBC: x / WBC x   Sq Epi: x / Non Sq Epi: x / Bacteria: x    RADIOLOGY & ADDITIONAL TESTS:      MEDICATIONS:    allopurinol 100 milliGRAM(s) Oral daily  atorvastatin 40 milliGRAM(s) Oral at bedtime  chlordiazePOXIDE   Oral   chlordiazePOXIDE 25 milliGRAM(s) Oral every 1 hour PRN  chlorhexidine 2% Cloths 1 Application(s) Topical <User Schedule>  folic acid 1 milliGRAM(s) Oral daily  heparin   Injectable 5000 Unit(s) SubCutaneous every 12 hours  labetalol 200 milliGRAM(s) Oral at bedtime  losartan 100 milliGRAM(s) Oral daily  multivitamin 1 Tablet(s) Oral daily  NIFEdipine XL 90 milliGRAM(s) Oral daily  sevelamer carbonate 800 milliGRAM(s) Oral three times a day with meals  thiamine IVPB 500 milliGRAM(s) IV Intermittent every 8 hours  
SUBJECTIVE/OVERNIGHT EVENTS  Today is hospital day 3d. This morning patient was seen and examined at bedside, resting comfortably in bed. No acute or major events overnight.    HOSPITAL COURSE  Day 1:   Day 2:   Day 3:     CODE STATUS:    FAMILY COMMUNICATION  Contact date:  Name of person contacted:  Relationship to patient:  Communication details:    MEDICATIONS  STANDING MEDICATIONS  allopurinol 100 milliGRAM(s) Oral daily  atorvastatin 40 milliGRAM(s) Oral at bedtime  chlordiazePOXIDE 50 milliGRAM(s) Oral every 12 hours  chlordiazePOXIDE   Oral   chlorhexidine 2% Cloths 1 Application(s) Topical <User Schedule>  folic acid 1 milliGRAM(s) Oral daily  heparin   Injectable 5000 Unit(s) SubCutaneous every 12 hours  labetalol 200 milliGRAM(s) Oral at bedtime  losartan 100 milliGRAM(s) Oral daily  multivitamin 1 Tablet(s) Oral daily  naltrexone 50 milliGRAM(s) Oral with breakfast  NIFEdipine XL 90 milliGRAM(s) Oral daily  sevelamer carbonate 800 milliGRAM(s) Oral three times a day with meals    PRN MEDICATIONS  chlordiazePOXIDE 25 milliGRAM(s) Oral every 1 hour PRN    VITALS  T(F): 97.7 (01-16-25 @ 04:50), Max: 97.7 (01-16-25 @ 04:50)  HR: 81 (01-16-25 @ 11:40) (65 - 107)  BP: 136/83 (01-16-25 @ 11:40) (118/78 - 150/80)  RR: 18 (01-16-25 @ 11:40) (18 - 18)  SpO2: 97% (01-16-25 @ 04:50) (97% - 99%)    PHYSICAL EXAM  GENERAL  (X  ) NAD, lying in bed comfortably     (  ) obtunded     (  ) lethargic     (  ) somnolent    HEAD  (X  ) Atraumatic     (  ) hematoma     (  ) laceration (specify location:       )     NECK  (X  ) Supple     (  ) neck stiffness     (  ) nuchal rigidity     (  )  no JVD     (  ) JVD present ( -- cm)    HEART  Rate -->  ( X ) normal rate    (  ) bradycardic    (  ) tachycardic  Rhythm -->  ( X ) regular    (  ) regularly irregular    (  ) irregularly irregular  Murmurs -->  X(  ) normal s1/s2    (  ) systolic murmur    (  ) diastolic murmur    (  ) continuous murmur     (  ) S3 present    (  ) S4 present    LUNGS  (X  )Unlabored respirations     (  ) tachypnea  ( X ) B/L air entry     (  ) decreased breath sounds in:  (location     )    ( X ) no adventitious sound     (  ) crackles     (  ) wheezing      (  ) rhonchi      (specify location:       )  (  ) chest wall tenderness (specify location:       )    ABDOMEN  (  X) Soft     (  ) tense   |   (  ) nondistended     (  ) distended   |   ( X) +BS     (  ) hypoactive bowel sounds     (  ) hyperactive bowel sounds  ( X ) nontender     (  ) RUQ tenderness     (  ) RLQ tenderness     (  ) LLQ tenderness     (  ) epigastric tenderness     (  ) diffuse tenderness  (  ) Splenomegaly      (  ) Hepatomegaly      (  ) Jaundice     (  ) ecchymosis     EXTREMITIES  (X  ) Normal     (  ) Rash     (  ) ecchymosis     (  ) varicose veins      (  ) pitting edema     (  ) non-pitting edema   (  ) ulceration     (  ) gangrene:     (location:     )    NERVOUS SYSTEM  (  X) A&Ox3     (  ) confused     (  ) lethargic  CN II-XII:     (  ) Intact     (  ) focal deficits  (Specify:     )   Upper extremities:     (  ) strength X/5     (  ) focal deficit (specify:    )  Lower extremities:     (  ) strength  X/5    (  ) focal deficit (specify:    )    SKIN  (  ) No rashes or lesions     (  ) maculopapular rash     (  ) pustules     (  ) vesicles     (  ) ulcer     (  ) ecchymosis     (specify location:     )        (  ) Central Line  Date inserted:  Location: (  ) Right IJ   (  ) Left IJ   (  ) Right Fem   (  ) Left Fem    LABS    141  |  101  |  57  -------------------------<  79   01-15-25 @ 06:06  4.3  |  22  |  12.7    Ca      9.1     01-15-25 @ 06:06  Phos   7.2     01-15-25 @ 06:06  Mg     2.2     01-15-25 @ 06:06          Urinalysis Basic - ( 15 Douglas 2025 06:06 )    Color: x / Appearance: x / SG: x / pH: x  Gluc: 79 mg/dL / Ketone: x  / Bili: x / Urobili: x   Blood: x / Protein: x / Nitrite: x   Leuk Esterase: x / RBC: x / WBC x   Sq Epi: x / Non Sq Epi: x / Bacteria: x          IMAGING
  Berry Crespo  49y  Male  ***My note supersedes ALL resident notes that I sign.  My corrections for their notes are in my note.***    I can be reached directly via Teams or my office number is [phone] or [phone].    INTERVAL EVENTS: Here for f/u of EtOH w/drawal. Pt feels fine. He prefers to go home tomorrow.    T(F): 97.7 (01-16-25 @ 04:50), Max: 97.7 (01-16-25 @ 04:50)  HR: 71 (01-16-25 @ 14:50) (65 - 107)  BP: 131/78 (01-16-25 @ 14:50) (118/78 - 150/80)  RR: 18 (01-16-25 @ 14:50) (18 - 18)  SpO2: 97% (01-16-25 @ 04:50) (97% - 99%)    Gen: NAD  HEENT: PERRL, EOMI, mouth clr, nose clr  Neck: no nodes, no JVD, thyroid nl  lungs: clr  hrt: s1 s2 rrr no murmur  abd: soft, NT/ND, no HS megaly  ext: no edema, no c/c; lt arm AVF w/ + bruit/thrill  neuro: aa ox3, cn intact, can move all 4 ext    LABS:  Urinalysis Basic - ( 15 Douglas 2025 06:06 )    Color: x / Appearance: x / SG: x / pH: x  Gluc: 79 mg/dL / Ketone: x  / Bili: x / Urobili: x   Blood: x / Protein: x / Nitrite: x   Leuk Esterase: x / RBC: x / WBC x   Sq Epi: x / Non Sq Epi: x / Bacteria: x    RADIOLOGY & ADDITIONAL TESTS:    MEDICATIONS:    allopurinol 100 milliGRAM(s) Oral daily  atorvastatin 40 milliGRAM(s) Oral at bedtime  chlordiazePOXIDE 50 milliGRAM(s) Oral every 12 hours  chlordiazePOXIDE   Oral   chlordiazePOXIDE 25 milliGRAM(s) Oral every 1 hour PRN  chlorhexidine 2% Cloths 1 Application(s) Topical <User Schedule>  folic acid 1 milliGRAM(s) Oral daily  heparin   Injectable 5000 Unit(s) SubCutaneous every 12 hours  labetalol 200 milliGRAM(s) Oral at bedtime  losartan 100 milliGRAM(s) Oral daily  multivitamin 1 Tablet(s) Oral daily  naltrexone 50 milliGRAM(s) Oral with breakfast  NIFEdipine XL 90 milliGRAM(s) Oral daily  sevelamer carbonate 800 milliGRAM(s) Oral three times a day with meals  
  JEOVANY HANSON  49y  Male  ***My note supersedes ALL resident notes that I sign.  My corrections for their notes are in my note.***    I can be reached directly via Teams or my office number is [phone] or [phone].    INTERVAL EVENTS: Here for f/u of EtOH w/drawal. Completed detox. Doing great. Ready to go home.    T(F): 98.1 (01-17-25 @ 05:08), Max: 98.2 (01-16-25 @ 19:52)  HR: 93 (01-17-25 @ 05:08) (93 - 93)  BP: 102/63 (01-17-25 @ 05:08) (102/63 - 111/70)  RR: 18 (01-17-25 @ 05:08) (18 - 18)  SpO2: 97% (01-17-25 @ 07:53) (96% - 100%)    Gen: NAD  HEENT: PERRL, EOMI, mouth clr, nose clr  Neck: no nodes, no JVD, thyroid nl  lungs: clr  hrt: s1 s2 rrr no murmur  abd: soft, NT/ND, no HS megaly  ext: no edema, no c/c; lt arm AVF w/ + bruit/thrill  neuro: aa ox3, cn intact, can move all 4 ext    LABS:    RADIOLOGY & ADDITIONAL TESTS:    MEDICATIONS:    allopurinol 100 milliGRAM(s) Oral daily  atorvastatin 40 milliGRAM(s) Oral at bedtime  chlorhexidine 2% Cloths 1 Application(s) Topical <User Schedule>  folic acid 1 milliGRAM(s) Oral daily  heparin   Injectable 5000 Unit(s) SubCutaneous every 12 hours  labetalol 200 milliGRAM(s) Oral at bedtime  losartan 100 milliGRAM(s) Oral daily  multivitamin 1 Tablet(s) Oral daily  naltrexone 50 milliGRAM(s) Oral with breakfast  NIFEdipine XL 90 milliGRAM(s) Oral daily  sevelamer carbonate 800 milliGRAM(s) Oral three times a day with meals  
SUBJECTIVE/OVERNIGHT EVENTS  Today is hospital day 2d. This morning patient was seen and examined at bedside, resting comfortably in bed. No acute or major events overnight.    HOSPITAL COURSE  Day 1:   Day 2:   Day 3:     CODE STATUS:    FAMILY COMMUNICATION  Contact date:  Name of person contacted:  Relationship to patient:  Communication details:    MEDICATIONS  STANDING MEDICATIONS  allopurinol 100 milliGRAM(s) Oral daily  atorvastatin 40 milliGRAM(s) Oral at bedtime  chlordiazePOXIDE 50 milliGRAM(s) Oral every 8 hours  chlordiazePOXIDE   Oral   chlorhexidine 2% Cloths 1 Application(s) Topical <User Schedule>  dextrose 5%. 1000 milliLiter(s) IV Continuous <Continuous>  folic acid 1 milliGRAM(s) Oral daily  heparin   Injectable 5000 Unit(s) SubCutaneous every 12 hours  labetalol 200 milliGRAM(s) Oral at bedtime  losartan 100 milliGRAM(s) Oral daily  multivitamin 1 Tablet(s) Oral daily  NIFEdipine XL 90 milliGRAM(s) Oral daily  sevelamer carbonate 800 milliGRAM(s) Oral three times a day with meals  thiamine IVPB 500 milliGRAM(s) IV Intermittent every 8 hours    PRN MEDICATIONS  chlordiazePOXIDE 25 milliGRAM(s) Oral every 1 hour PRN    VITALS  T(F): 98 (01-15-25 @ 04:33), Max: 98 (01-15-25 @ 04:33)  HR: 78 (01-15-25 @ 04:33) (70 - 78)  BP: 167/83 (01-15-25 @ 04:33) (138/73 - 167/83)  RR: 18 (01-15-25 @ 04:33) (18 - 18)  SpO2: 100% (01-15-25 @ 04:33) (99% - 100%)    PHYSICAL EXAM  GENERAL  ( X ) NAD, lying in bed comfortably     (  ) obtunded     (  ) lethargic     (  ) somnolent    HEAD  (X  ) Atraumatic     (  ) hematoma     (  ) laceration (specify location:       )     NECK  ( X ) Supple     (  ) neck stiffness     (  ) nuchal rigidity     (  )  no JVD     (  ) JVD present ( -- cm)    HEART  Rate -->  (X  ) normal rate    (  ) bradycardic    (  ) tachycardic  Rhythm -->  (X  ) regular    (  ) regularly irregular    (  ) irregularly irregular  Murmurs -->  ( X ) normal s1/s2    (  ) systolic murmur    (  ) diastolic murmur    (  ) continuous murmur     (  ) S3 present    (  ) S4 present    LUNGS  ( X )Unlabored respirations     (  ) tachypnea  ( X ) B/L air entry     (  ) decreased breath sounds in:  (location     )    ( X ) no adventitious sound     (  ) crackles     (  ) wheezing      (  ) rhonchi      (specify location:       )  (  ) chest wall tenderness (specify location:       )    ABDOMEN  (X  ) Soft     (  ) tense   |   (  ) nondistended     (  ) distended   |   (  ) +BS     (  ) hypoactive bowel sounds     (  ) hyperactive bowel sounds  (X  ) nontender     (  ) RUQ tenderness     (  ) RLQ tenderness     (  ) LLQ tenderness     (  ) epigastric tenderness     (  ) diffuse tenderness  (  ) Splenomegaly      (  ) Hepatomegaly      (  ) Jaundice     (  ) ecchymosis     EXTREMITIES  (  X) Normal     (  ) Rash     (  ) ecchymosis     (  ) varicose veins      (  ) pitting edema     (  ) non-pitting edema   (  ) ulceration     (  ) gangrene:     (location:     )    NERVOUS SYSTEM  (X  ) A&Ox3     (  ) confused     (  ) lethargic  CN II-XII:     (  ) Intact     (  ) focal deficits  (Specify:     )   Upper extremities:     (  ) strength X/5     (  ) focal deficit (specify:    )  Lower extremities:     (  ) strength  X/5    (  ) focal deficit (specify:    )    SKIN  ( X ) No rashes or lesions     (  ) maculopapular rash     (  ) pustules     (  ) vesicles     (  ) ulcer     (  ) ecchymosis     (specify location:     )    (  ) Indwelling Rodriguez Catheter   Date insterted:    Reason (  ) Critical illness     (  ) urinary retention    (  ) Accurate Ins/Outs Monitoring     (  ) CMO patient    (  ) Central Line  Date inserted:  Location: (  ) Right IJ   (  ) Left IJ   (  ) Right Fem   (  ) Left Fem    LABS             11.2   6.86  )-----------( 159      ( 01-14-25 @ 06:17 )             34.8     141  |  101  |  57  -------------------------<  79   01-15-25 @ 06:06  4.3  |  22  |  12.7    Ca      9.1     01-15-25 @ 06:06  Phos   7.2     01-15-25 @ 06:06  Mg     2.2     01-15-25 @ 06:06    TPro  6.3  /  Alb  4.1  /  TBili  0.5  /  DBili  x   /  AST  14  /  ALT  11  /  AlkPhos  72  /  GGT  x     01-14-25 @ 06:17        Urinalysis Basic - ( 15 Douglas 2025 06:06 )    Color: x / Appearance: x / SG: x / pH: x  Gluc: 79 mg/dL / Ketone: x  / Bili: x / Urobili: x   Blood: x / Protein: x / Nitrite: x   Leuk Esterase: x / RBC: x / WBC x   Sq Epi: x / Non Sq Epi: x / Bacteria: x          IMAGING
seen and examined  24 h events noted   no distress           PAST HISTORY  --------------------------------------------------------------------------------  No significant changes to PMH, PSH, FHx, SHx, unless otherwise noted    ALLERGIES & MEDICATIONS  --------------------------------------------------------------------------------  Allergies    No Known Allergies    Intolerances      Standing Inpatient Medications  allopurinol 100 milliGRAM(s) Oral daily  atorvastatin 40 milliGRAM(s) Oral at bedtime  chlorhexidine 2% Cloths 1 Application(s) Topical <User Schedule>  folic acid 1 milliGRAM(s) Oral daily  heparin   Injectable 5000 Unit(s) SubCutaneous every 12 hours  labetalol 200 milliGRAM(s) Oral at bedtime  losartan 100 milliGRAM(s) Oral daily  multivitamin 1 Tablet(s) Oral daily  naltrexone 50 milliGRAM(s) Oral with breakfast  NIFEdipine XL 90 milliGRAM(s) Oral daily  sevelamer carbonate 800 milliGRAM(s) Oral three times a day with meals    PRN Inpatient Medications          VITALS/PHYSICAL EXAM  --------------------------------------------------------------------------------  T(C): 36.7 (01-17-25 @ 05:08), Max: 36.8 (01-16-25 @ 19:52)  HR: 93 (01-17-25 @ 05:08) (71 - 93)  BP: 102/63 (01-17-25 @ 05:08) (102/63 - 136/83)  RR: 18 (01-17-25 @ 05:08) (18 - 18)  SpO2: 97% (01-17-25 @ 07:53) (96% - 100%)  Wt(kg): --        01-16-25 @ 07:01  -  01-17-25 @ 07:00  --------------------------------------------------------  IN: 0 mL / OUT: 2000 mL / NET: -2000 mL          LABS/STUDIES  --------------------------------------------------------------------------------      Creatinine Trend:  SCr 12.7 [01-15 @ 06:06]  SCr 11.7 [01-14 @ 06:17]  SCr 11.3 [01-13 @ 21:23]  SCr 10.6 [01-13 @ 14:43]    Urinalysis - [01-15-25 @ 06:06]      Color  / Appearance  / SG  / pH       Gluc 79 / Ketone   / Bili  / Urobili        Blood  / Protein  / Leuk Est  / Nitrite       RBC  / WBC  / Hyaline  / Gran  / Sq Epi  / Non Sq Epi  / Bacteria       Iron 95, TIBC 167, %sat 57      [01-14-25 @ 06:17]  Ferritin 1605      [01-14-25 @ 06:17]      
  Pt interviewed, examined and EMR chart reviewed. Patient did not require any prn benzodiazepines in the past 24 hours.     48yo domiciled Male with hx of ESRD on HD, gout and HTN, and PPHx of depression and alcohol use disorder who presents from outpatient facility for inpatient detox. Addiction Medicine consulted for assistance with alcohol detoxication management.     Patient was seen this AM sitting upright, in no acute distress. Patient reports feeling improved on current regimen, he denies HA, N/V, diaphoresis, tactile disturbances, AH/ VH, anxiety, SI or HI.  CIWA 2 for tremors.   Patient was explained MAT for AUD treatment options. Patient is interested in starting daily naltrexone tabs to help with his cravings and maintaining abstinence on discharge. Will recommend starting naltrexone tonight 50 mg daily after dinner.   Patient was also explained that he is not a candidate for inpatient rehab facilities given dialysis, however strongly encouraging individual and group support counseling outpatient with [facility]. Patient will be given appt by [facility] counselor.     REVIEW OF SYSTEMS: per HPI     MEDICATIONS  (STANDING):  allopurinol 100 milliGRAM(s) Oral daily  atorvastatin 40 milliGRAM(s) Oral at bedtime  chlordiazePOXIDE   Oral   chlorhexidine 2% Cloths 1 Application(s) Topical <User Schedule>  folic acid 1 milliGRAM(s) Oral daily  heparin   Injectable 5000 Unit(s) SubCutaneous every 12 hours  labetalol 200 milliGRAM(s) Oral at bedtime  losartan 100 milliGRAM(s) Oral daily  multivitamin 1 Tablet(s) Oral daily  NIFEdipine XL 90 milliGRAM(s) Oral daily  sevelamer carbonate 800 milliGRAM(s) Oral three times a day with meals    MEDICATIONS  (PRN):  chlordiazePOXIDE 25 milliGRAM(s) Oral every 1 hour PRN Symptom-triggered: each CIWA -Ar score 8 or GREATER        Vital Signs Last 24 Hrs  T(C): 36.7 (14 Jan 2025 04:31), Max: 36.9 (13 Jan 2025 12:45)  T(F): 98.1 (14 Jan 2025 04:31), Max: 98.5 (13 Jan 2025 12:45)  HR: 82 (14 Jan 2025 04:31) (82 - 98)  BP: 152/91 (14 Jan 2025 04:31) (133/86 - 164/92)  BP(mean): 111 (14 Jan 2025 04:31) (111 - 116)  RR: 18 (14 Jan 2025 04:31) (18 - 18)  SpO2: 98% (14 Jan 2025 04:31) (98% - 100%)    Parameters below as of 14 Jan 2025 04:31  Patient On (Oxygen Delivery Method): room air      PHYSICAL EXAM:    Constitutional: NAD, well-groomed, well-developed  HEENT: PERRLA, EOMI. no nystagmus.   Respiratory: no increased work of breathing  Extremities: +ve bilateral hand tremors.   Neurological: A/O x 3, no focal deficits    MENTAL STATUS EXAM:  Appearance: calm, in hospital attire   Appearance in relation to age: looks stated age      Hygiene/Grooming: fair grooming   Attitude toward examiner: fully cooperative  Alertness: alert  Orientation: oriented to time, place and person  Posture: sitting in bed  Gait: not evaluated  Behavior and psychomotor activity: normal  Mood: euthymic  Affect: full range and reactivity      Speech: fluent and spontaneous; normal rate, rhythm, volume and tone   Perceptions: denies hallucinations  Thought process: logical, linear and goal-directed    Thought content: no delusions or particular preoccupation, denies SI/HI  Associations: no loosening of associations   Impulse Control: aware of socially acceptable behavior  Insight: aware of need to change behaviors to remain sober  Judgment: understands consequences of alcohol use    LABS:  Basic Metabolic Panel in AM (01.15.25 @ 06:06)   Sodium: 141 mmol/L  Potassium: 4.3 mmol/L  Chloride: 101 mmol/L  Carbon Dioxide: 22 mmol/L  Anion Gap: 18 mmol/L  Blood Urea Nitrogen: 57 mg/dL  Creatinine: 12.7: Critical value: mg/dL  Glucose: 79 mg/dL  Calcium: 9.1 mg/dL  eGFR: 4: The estimated glomerular filtration rate (eGFR) calculation is based on   the 2021 CKD-EPI creatinine equation, which is validated in male and   female population 18 years of age and older (N Engl J Med 2021;   [id number]). mL/min/1.73m2  Phosphorus in AM (01.15.25 @ 06:06)   Phosphorus: 7.2 mg/dL  Magnesium (01.15.25 @ 06:06)   Magnesium: 2.2 mg/dL  Vitamin B12, Serum in AM (01.14.25 @ 06:17)   Vitamin B12, Serum: 277 pg/mL    Urinalysis Basic - ( 14 Jan 2025 06:17 )    Color: x / Appearance: x / SG: x / pH: x  Gluc: 78 mg/dL / Ketone: x  / Bili: x / Urobili: x   Blood: x / Protein: x / Nitrite: x   Leuk Esterase: x / RBC: x / WBC x   Sq Epi: x / Non Sq Epi: x / Bacteria: x      Drug Screen Urine: completed:. neg except for benzodiazepines administered inpatient   Alcohol Level: none    
 seen and examined  24 hevents noted   no distress         PAST HISTORY  --------------------------------------------------------------------------------  No significant changes to PMH, PSH, FHx, SHx, unless otherwise noted    ALLERGIES & MEDICATIONS  --------------------------------------------------------------------------------  Allergies    No Known Allergies    Intolerances      Standing Inpatient Medications  allopurinol 100 milliGRAM(s) Oral daily  atorvastatin 40 milliGRAM(s) Oral at bedtime  chlordiazePOXIDE 50 milliGRAM(s) Oral every 8 hours  chlordiazePOXIDE   Oral   chlorhexidine 2% Cloths 1 Application(s) Topical <User Schedule>  dextrose 5%. 1000 milliLiter(s) IV Continuous <Continuous>  folic acid 1 milliGRAM(s) Oral daily  heparin   Injectable 5000 Unit(s) SubCutaneous every 12 hours  labetalol 200 milliGRAM(s) Oral at bedtime  losartan 100 milliGRAM(s) Oral daily  multivitamin 1 Tablet(s) Oral daily  NIFEdipine XL 90 milliGRAM(s) Oral daily  sevelamer carbonate 800 milliGRAM(s) Oral three times a day with meals  thiamine IVPB 500 milliGRAM(s) IV Intermittent every 8 hours    PRN Inpatient Medications  chlordiazePOXIDE 25 milliGRAM(s) Oral every 1 hour PRN          VITALS/PHYSICAL EXAM  --------------------------------------------------------------------------------  T(C): 36.7 (01-15-25 @ 04:33), Max: 36.7 (01-15-25 @ 04:33)  HR: 78 (01-15-25 @ 04:33) (70 - 78)  BP: 167/83 (01-15-25 @ 04:33) (138/73 - 167/83)  RR: 18 (01-15-25 @ 04:33) (18 - 18)  SpO2: 100% (01-15-25 @ 04:33) (99% - 100%)  Wt(kg): --  Height (cm): 185.4 (01-13-25 @ 12:45)  Weight (kg): 97.5 (01-13-25 @ 12:45)  BMI (kg/m2): 28.4 (01-13-25 @ 12:45)  BSA (m2): 2.22 (01-13-25 @ 12:45)      01-15-25 @ 07:01  -  01-15-25 @ 11:15  --------------------------------------------------------  IN: 230 mL / OUT: 0 mL / NET: 230 mL      Physical Exam:  	Gen: NAD  	Pulm: CTA B/L  	CV:  S1S2; no rub  	Abd: +distended  	Vascular access:    LABS/STUDIES  --------------------------------------------------------------------------------              11.2   6.86  >-----------<  159      [01-14-25 @ 06:17]              34.8     141  |  101  |  57  ----------------------------<  79      [01-15-25 @ 06:06]  4.3   |  22  |  12.7        Ca     9.1     [01-15-25 @ 06:06]      Mg     2.2     [01-15-25 @ 06:06]      Phos  7.2     [01-15-25 @ 06:06]    TPro  6.3  /  Alb  4.1  /  TBili  0.5  /  DBili  x   /  AST  14  /  ALT  11  /  AlkPhos  72  [01-14-25 @ 06:17]    Creatinine Trend:  SCr 12.7 [01-15 @ 06:06]  SCr 11.7 [01-14 @ 06:17]  SCr 11.3 [01-13 @ 21:23]  SCr 10.6 [01-13 @ 14:43]    Urinalysis - [01-15-25 @ 06:06]      Color  / Appearance  / SG  / pH       Gluc 79 / Ketone   / Bili  / Urobili        Blood  / Protein  / Leuk Est  / Nitrite       RBC  / WBC  / Hyaline  / Gran  / Sq Epi  / Non Sq Epi  / Bacteria       Iron 95, TIBC 167, %sat 57      [01-14-25 @ 06:17]  Ferritin 1605      [01-14-25 @ 06:17]      
24 h events noted   no distress         PAST HISTORY  --------------------------------------------------------------------------------  No significant changes to PMH, PSH, FHx, SHx, unless otherwise noted    ALLERGIES & MEDICATIONS  --------------------------------------------------------------------------------  Allergies    No Known Allergies    Intolerances      Standing Inpatient Medications  allopurinol 100 milliGRAM(s) Oral daily  atorvastatin 40 milliGRAM(s) Oral at bedtime  chlordiazePOXIDE 50 milliGRAM(s) Oral every 12 hours  chlordiazePOXIDE   Oral   chlorhexidine 2% Cloths 1 Application(s) Topical <User Schedule>  folic acid 1 milliGRAM(s) Oral daily  heparin   Injectable 5000 Unit(s) SubCutaneous every 12 hours  labetalol 200 milliGRAM(s) Oral at bedtime  losartan 100 milliGRAM(s) Oral daily  multivitamin 1 Tablet(s) Oral daily  naltrexone 50 milliGRAM(s) Oral with breakfast  NIFEdipine XL 90 milliGRAM(s) Oral daily  sevelamer carbonate 800 milliGRAM(s) Oral three times a day with meals    PRN Inpatient Medications  chlordiazePOXIDE 25 milliGRAM(s) Oral every 1 hour PRN          VITALS/PHYSICAL EXAM  --------------------------------------------------------------------------------  T(C): 36.5 (01-16-25 @ 04:50), Max: 36.5 (01-16-25 @ 04:50)  HR: 65 (01-16-25 @ 04:50) (65 - 107)  BP: 150/80 (01-16-25 @ 04:50) (118/78 - 150/80)  RR: 18 (01-16-25 @ 04:50) (18 - 18)  SpO2: 97% (01-16-25 @ 04:50) (97% - 99%)  Wt(kg): --        01-15-25 @ 07:01  -  01-16-25 @ 07:00  --------------------------------------------------------  IN: 390 mL / OUT: 0 mL / NET: 390 mL        LABS/STUDIES  --------------------------------------------------------------------------------    141  |  101  |  57  ----------------------------<  79      [01-15-25 @ 06:06]  4.3   |  22  |  12.7        Ca     9.1     [01-15-25 @ 06:06]      Mg     2.2     [01-15-25 @ 06:06]      Phos  7.2     [01-15-25 @ 06:06]      Creatinine Trend:  SCr 12.7 [01-15 @ 06:06]  SCr 11.7 [01-14 @ 06:17]  SCr 11.3 [01-13 @ 21:23]  SCr 10.6 [01-13 @ 14:43]    Urinalysis - [01-15-25 @ 06:06]      Color  / Appearance  / SG  / pH       Gluc 79 / Ketone   / Bili  / Urobili        Blood  / Protein  / Leuk Est  / Nitrite       RBC  / WBC  / Hyaline  / Gran  / Sq Epi  / Non Sq Epi  / Bacteria       Iron 95, TIBC 167, %sat 57      [01-14-25 @ 06:17]  Ferritin 1605      [01-14-25 @ 06:17]      
SUBJECTIVE/OVERNIGHT EVENTS  Today is hospital day 1d. This morning patient was seen and examined at bedside, resting comfortably in bed. No acute or major events overnight.    HOSPITAL COURSE  Day 1:   Day 2:   Day 3:     CODE STATUS:    FAMILY COMMUNICATION  Contact date:  Name of person contacted:  Relationship to patient:  Communication details:    MEDICATIONS  STANDING MEDICATIONS  allopurinol 100 milliGRAM(s) Oral daily  atorvastatin 40 milliGRAM(s) Oral at bedtime  chlordiazePOXIDE 50 milliGRAM(s) Oral every 8 hours  chlordiazePOXIDE   Oral   dextrose 5%. 1000 milliLiter(s) IV Continuous <Continuous>  folic acid 1 milliGRAM(s) Oral daily  heparin   Injectable 5000 Unit(s) SubCutaneous every 12 hours  labetalol 200 milliGRAM(s) Oral at bedtime  losartan 100 milliGRAM(s) Oral daily  multivitamin 1 Tablet(s) Oral daily  NIFEdipine XL 90 milliGRAM(s) Oral daily  sevelamer carbonate 800 milliGRAM(s) Oral three times a day with meals  thiamine IVPB 500 milliGRAM(s) IV Intermittent every 8 hours    PRN MEDICATIONS  chlordiazePOXIDE 25 milliGRAM(s) Oral every 1 hour PRN    VITALS  T(F): 97.6 (01-14-25 @ 12:18), Max: 98.4 (01-13-25 @ 20:22)  HR: 76 (01-14-25 @ 12:18) (76 - 98)  BP: 138/73 (01-14-25 @ 12:18) (133/86 - 164/92)  RR: 18 (01-14-25 @ 12:18) (18 - 18)  SpO2: 98% (01-14-25 @ 04:31) (98% - 100%)    PHYSICAL EXAM  GENERAL  (  X) NAD, lying in bed comfortably     (  ) obtunded     (  ) lethargic     (  ) somnolent    HEAD  ( X ) Atraumatic     (  ) hematoma     (  ) laceration (specify location:       )     NECK  ( X ) Supple     (  ) neck stiffness     (  ) nuchal rigidity     (  )  no JVD     (  ) JVD present ( -- cm)    HEART  Rate -->  ( X ) normal rate    (  ) bradycardic    (  ) tachycardic  Rhythm -->  ( X ) regular    (  ) regularly irregular    (  ) irregularly irregular  Murmurs -->  ( X ) normal s1/s2    (  ) systolic murmur    (  ) diastolic murmur    (  ) continuous murmur     (  ) S3 present    (  ) S4 present    LUNGS  ( X )Unlabored respirations     (  ) tachypnea  ( X ) B/L air entry     (  ) decreased breath sounds in:  (location     )    ( X ) no adventitious sound     (  ) crackles     (  ) wheezing      (  ) rhonchi      (specify location:       )  (  ) chest wall tenderness (specify location:       )    ABDOMEN  ( X ) Soft     (  ) tense   |   (  ) nondistended     (  ) distended   |   (X  ) +BS     (  ) hypoactive bowel sounds     (  ) hyperactive bowel sounds  ( X ) nontender     (  ) RUQ tenderness     (  ) RLQ tenderness     (  ) LLQ tenderness     (  ) epigastric tenderness     (  ) diffuse tenderness  (  ) Splenomegaly      (  ) Hepatomegaly      (  ) Jaundice     (  ) ecchymosis     EXTREMITIES  (  X) Normal     (  ) Rash     (  ) ecchymosis     (  ) varicose veins      (  ) pitting edema     (  ) non-pitting edema   (  ) ulceration     (  ) gangrene:     (location:     )    NERVOUS SYSTEM  (X  ) A&Ox3     (  ) confused     (  ) lethargic  CN II-XII:     (  ) Intact     (  ) focal deficits  (Specify:     )   Upper extremities:     (  ) strength X/5     (  ) focal deficit (specify:    )  Lower extremities:     (  ) strength  X/5    (  ) focal deficit (specify:    )    SKIN  (  X) No rashes or lesions     (  ) maculopapular rash     (  ) pustules     (  ) vesicles     (  ) ulcer     (  ) ecchymosis     (specify location:     )        LABS             11.2   6.86  )-----------( 159      ( 01-14-25 @ 06:17 )             34.8     141  |  99  |  52  -------------------------<  78   01-14-25 @ 06:17  4.4  |  24  |  11.7    Ca      9.3     01-14-25 @ 06:17  Phos   7.5     01-14-25 @ 06:17  Mg     2.3     01-14-25 @ 06:17    TPro  6.3  /  Alb  4.1  /  TBili  0.5  /  DBili  x   /  AST  14  /  ALT  11  /  AlkPhos  72  /  GGT  x     01-14-25 @ 06:17        Urinalysis Basic - ( 14 Jan 2025 06:17 )    Color: x / Appearance: x / SG: x / pH: x  Gluc: 78 mg/dL / Ketone: x  / Bili: x / Urobili: x   Blood: x / Protein: x / Nitrite: x   Leuk Esterase: x / RBC: x / WBC x   Sq Epi: x / Non Sq Epi: x / Bacteria: x          IMAGING
  Pt interviewed, examined and EMR chart reviewed. Patient did not require any prn benzodiazepines in the past 24 hours.     48yo domiciled Male with hx of ESRD on HD, gout and HTN, and PPHx of depression and alcohol use disorder who presents from outpatient facility for inpatient detox. Addiction Medicine consulted for assistance with alcohol detoxication management.     Patient was seen this AM sitting upright, in no acute distress. Patient reports feeling improved on current regimen, he denies HA, N/V, diaphoresis, tactile disturbances, AH/ VH, anxiety, SI or HI.  CIWA 2 for tremors.     Patient will start naltrexone 50 mg today for alcohol cessation and will be continued at [facility] with counseling. Apt scheduled on 1/24 at 11AM.    REVIEW OF SYSTEMS: per HPI     MEDICATIONS  (STANDING):  allopurinol 100 milliGRAM(s) Oral daily  atorvastatin 40 milliGRAM(s) Oral at bedtime  chlordiazePOXIDE 50 milliGRAM(s) Oral every 12 hours  chlordiazePOXIDE   Oral   chlorhexidine 2% Cloths 1 Application(s) Topical <User Schedule>  folic acid 1 milliGRAM(s) Oral daily  heparin   Injectable 5000 Unit(s) SubCutaneous every 12 hours  labetalol 200 milliGRAM(s) Oral at bedtime  losartan 100 milliGRAM(s) Oral daily  multivitamin 1 Tablet(s) Oral daily  naltrexone 50 milliGRAM(s) Oral with breakfast  NIFEdipine XL 90 milliGRAM(s) Oral daily  sevelamer carbonate 800 milliGRAM(s) Oral three times a day with meals    MEDICATIONS  (PRN):  chlordiazePOXIDE 25 milliGRAM(s) Oral every 1 hour PRN Symptom-triggered: each CIWA -Ar score 8 or GREATER    Vital Signs Last 24 Hrs  T(C): 36.5 (16 Jan 2025 04:50), Max: 36.5 (16 Jan 2025 04:50)  T(F): 97.7 (16 Jan 2025 04:50), Max: 97.7 (16 Jan 2025 04:50)  HR: 81 (16 Jan 2025 11:40) (65 - 107)  BP: 136/83 (16 Jan 2025 11:40) (118/78 - 150/80)  BP(mean): --  RR: 18 (16 Jan 2025 11:40) (18 - 18)  SpO2: 97% (16 Jan 2025 04:50) (97% - 99%)    Parameters below as of 16 Jan 2025 11:40  Patient On (Oxygen Delivery Method): room air        PHYSICAL EXAM:    Constitutional: NAD, well-groomed, well-developed  HEENT: PERRLA, EOMI. no nystagmus.   Respiratory: no increased work of breathing  Extremities: +ve bilateral hand tremors.   Neurological: A/O x 3, no focal deficits    MENTAL STATUS EXAM:  Appearance: calm, in hospital attire   Appearance in relation to age: looks stated age      Hygiene/Grooming: fair grooming   Attitude toward examiner: fully cooperative  Alertness: alert  Orientation: oriented to time, place and person  Posture: sitting in bed  Gait: not evaluated  Behavior and psychomotor activity: normal  Mood: euthymic  Affect: full range and reactivity      Speech: fluent and spontaneous; normal rate, rhythm, volume and tone   Perceptions: denies hallucinations  Thought process: logical, linear and goal-directed    Thought content: no delusions or particular preoccupation, denies SI/HI  Associations: no loosening of associations   Impulse Control: aware of socially acceptable behavior  Insight: aware of need to change behaviors to remain sober  Judgment: understands consequences of alcohol use    LABS:  Basic Metabolic Panel in AM (01.15.25 @ 06:06)   Sodium: 141 mmol/L  Potassium: 4.3 mmol/L  Chloride: 101 mmol/L  Carbon Dioxide: 22 mmol/L  Anion Gap: 18 mmol/L  Blood Urea Nitrogen: 57 mg/dL  Creatinine: 12.7: Critical value: mg/dL  Glucose: 79 mg/dL  Calcium: 9.1 mg/dL  eGFR: 4: The estimated glomerular filtration rate (eGFR) calculation is based on   the 2021 CKD-EPI creatinine equation, which is validated in male and   female population 18 years of age and older (N Engl J Med 2021;   [id number]:0245-1389). mL/min/1.73m2  Phosphorus in AM (01.15.25 @ 06:06)   Phosphorus: 7.2 mg/dL  Magnesium (01.15.25 @ 06:06)   Magnesium: 2.2 mg/dL  Vitamin B12, Serum in AM (01.14.25 @ 06:17)   Vitamin B12, Serum: 277 pg/mL    Urinalysis Basic - ( 14 Jan 2025 06:17 )    Color: x / Appearance: x / SG: x / pH: x  Gluc: 78 mg/dL / Ketone: x  / Bili: x / Urobili: x   Blood: x / Protein: x / Nitrite: x   Leuk Esterase: x / RBC: x / WBC x   Sq Epi: x / Non Sq Epi: x / Bacteria: x      Drug Screen Urine: completed:. neg except for benzodiazepines administered inpatient   Alcohol Level: none

## 2025-01-17 NOTE — PROGRESS NOTE ADULT - ASSESSMENT
49 year old man with hx of ESRD on HD, gout and HTN, alcohol use disorder presents for inpatient detox.   # ESRD on HD  # ETOH abuse disorder on detox   - HD in am   3h opti 160 UF 3l as tolerated of remains in the hospital otherwise at [facility]   - IP and Ca noted  on sevelamer carbonate one tab po qac   - last  Hb noted no MATT   -  bp controlled   - detox /addiction notes appreciated   will follow 
49 year old man with hx of ESRD on HD, gout and HTN, alcohol use disorder presents for inpatient detox.   # ESRD on HD  # ETOH abuse disorder on detox   - HD in am  3h opti 160 UF 3l as tolerated   - IP and Ca noted  on sevelamer carbonate one tab po qac   - Hb noted no MATT   - follow bp readings   - detox /addiction notes appreciated   will follow 
49 year old man with hx of ESRD on HD, gout and HTN, alcohol use disorder presents for inpatient detox.   Ciwa 6. Seen by [facility], not good candidate for outpt detox, and pt very motivated today, so reasonable to take advantage of motivation today and admit for detox.    #Detox  #Alcohol withdrawal  -patient is motivated to start management  -last drink was 1/12 at 11 pm. He drinks Vodka everyday.    -start folate  Thiamine 500 Q8H for 3 days then once a day 100mg   -D5 at 40 ml/hr - check FS BID  -CIWA protocol standing and as needed  -Addiction med consult -> continue monitoring for atleast 2 more days here on librium taper and CIWA protocol   -check urine drug screen     #HTN  #Gout  #ESRD on HD   -patient on HD, TTS, consult nephro   Started on sevelamer with meals for hyperphos   Dialysis on 1/14 nephro on board   -c/w Labetalol 200, Nifedipine 90, and Losartan 100  -c/w Allopurinol 100 OD and Lipitor 40 qhs    
49 year old man with hx of ESRD on HD, gout and HTN, alcohol use disorder presents for inpatient detox.   Ciwa 6. Seen by [facility], not good candidate for outpt detox, and pt very motivated today, so reasonable to take advantage of motivation today and admit for detox.    #Detox  #Alcohol withdrawal  -patient is motivated to start management  -last drink was 1/12 at 11 pm. He drinks Vodka everyday.    -start folate  Thiamine 500 Q8H for 3 days then once a day 100mg   -D5 at 40 ml/hr - check FS BID  -CIWA protocol standing and as needed  -Addiction med consult -> continue monitoring for atleast 2 more days here on librium taper and CIWA protocol   -check urine drug screen     #HTN  #Gout  #ESRD on HD   -patient on HD, TTS, consult nephro   Started on sevelamer with meals for hyperphos   Dialysis on 1/14 nephro on board   -c/w Labetalol 200, Nifedipine 90, and Losartan 100  -c/w Allopurinol 100 OD and Lipitor 40 qhs    
49 year old man with hx of ESRD on HD, gout and HTN, alcohol use disorder presents for inpatient detox.   # ESRD on HD  # ETOH abuse disorder on detox   - HD today   3h opti 160 UF 3l as tolerated   - IP and Ca noted  on sevelamer carbonate one tab po qac   - last  Hb noted no MATT   - follow bp readings / controlled   - detox /addiction notes appreciated   will follow 
49 year old man with hx of ESRD on HD, gout and HTN, alcohol use disorder presents for inpatient detox.   Ciwa 6. Seen by [facility], not good candidate for outpt detox, and pt very motivated today, so reasonable to take advantage of motivation today and admit for detox.    #Detox  #Alcohol withdrawal  -patient is motivated to start management  -last drink was 1/12 at 11 pm. He drinks Vodka everyday.    -start folate  Thiamine 500 Q8H for 3 days then once a day 100mg   -D5 at 40 ml/hr - check FS BID  -CIWA protocol standing and as needed  taper finish 1/19 pm   -Addiction med consult   -check urine drug screen   Started on naltrexone 50mg once a day   outpatient follow up 1/21     #HTN  #Gout  #ESRD on HD   -patient on HD, TTS, consult nephro   Started on sevelamer with meals for hyperphos   Dialysis on 1/14 nephro on board   -c/w Labetalol 200, Nifedipine 90, and Losartan 100  -c/w Allopurinol 100 OD and Lipitor 40 qhs  
49 year old man with hx of ESRD on HD, gout and HTN, alcohol use disorder presents for inpatient detox.   Janis 6. Seen by SB[facility], not good candidate for outpt detox, and pt very motivated today, so reasonable to take advantage of motivation today and admit for detox.    # pt is immunocompromised 2/2 EtOH abuse; ESRD    # Detox for Alcohol withdrawal  last drink was 1/12 at 11 pm. He drinks Vodka everyday.    MVI po q24  d/c Thiamine iv  d/c IVFs  CIWA protocol standing and as needed  Addiction med consult -> finish taper on 1/16 (no need to go to 1/17)  -check urine drug screen   CATCH: OUTPT EtOH rehab (cannot do inpt rehab on HD)    # folate def  folic acid 1mg po q24    # HTN  c/w labetalol 200 hs (consider 100mg po q12 as better around the clock control)  c/w losartan 100mg po q24  c/w nifed xl 90 q24    # Gout  c/w allopurinol 100 q24    # ESRD on HD   patient on HD, TTS, consult nephro   c/w sevelamer 800mg with meals for hyperphosphatemia     # HLD  c/w Lipitor 40 qhs    # DVT ppx: hep 5K q12    # GI ppx: none    # Activity: Independent     # Code: full    Dispo: f/u renal re HD; f/u Add Med/CATCH; librium taper to 1/16; Utox:   eventually, pt will go home w/ outpt EtOH rehab and outpt psych eval for depression - f/u CM/CATCH - anticipate d/c 24-48 hrs  
49 year old man with hx of ESRD on HD, gout and HTN, alcohol use disorder presents for inpatient detox.   Janis 6. Seen by SB[facility], not good candidate for outpt detox, and pt very motivated today, so reasonable to take advantage of motivation today and admit for detox.    # pt is immunocompromised 2/2 EtOH abuse; ESRD    # Detox for Alcohol withdrawal  last drink was 1/12 at 11 pm. He drinks Vodka everyday.    MVI po q24  d/c Thiamine iv  d/c IVFs  CIWA protocol standing and as needed  Addiction med consult -> finish taper today  d/c prn librium  urine drug screen: + benzo (which we are giving him)  CATCH: OUTPT EtOH rehab (cannot do inpt rehab on HD)    # folate def  folic acid 1mg po q24    # HTN  c/w labetalol 200 hs (consider 100mg po q12 as better around the clock control)  c/w losartan 100mg po q24  c/w nifed xl 90 q24    # Gout  c/w allopurinol 100 q24    # ESRD on HD   patient on HD, TTS, consult nephro   c/w sevelamer 800mg with meals for hyperphosphatemia     # HLD  c/w Lipitor 40 qhs    # DVT ppx: hep 5K q12    # GI ppx: none    # Activity: Independent     # Code: full    Dispo: f/u renal re HD; f/u Add Med/CATCH; finish librium taper   eventually, pt will go home w/ outpt EtOH rehab (1/24 @ 11am) and outpt psych eval for depression - f/u CM/CATCH - anticipate d/c 24hrs  
49 year old man with hx of ESRD on HD, gout and HTN, alcohol use disorder presents for inpatient detox.   Te[place]wa 6. Seen by SB[facility], not good candidate for outpt detox, and pt very motivated today, so reasonable to take advantage of motivation today and admit for detox.    # pt is immunocompromised 2/2 EtOH abuse; ESRD    # Detox for Alcohol withdrawal  last drink was 1/12 at 11 pm. He drinks Vodka everyday.    MVI po q24  d/c Thiamine iv  d/c IVFs  CIWA protocol standing and as needed  Addiction med consult -> finished detox  d/c prn librium  urine drug screen: + benzo (which we are giving him)  CATCH: OUTPT EtOH rehab (cannot do inpt rehab on HD)    # folate def  folic acid 1mg po q24    # HTN  c/w labetalol 200 hs (consider 100mg po q12 as better around the clock control)  c/w losartan 100mg po q24  c/w nifed xl 90 q24    # Gout  c/w allopurinol 100 q24    # ESRD on HD   patient on HD, TTS, consult nephro   c/w sevelamer 800mg with meals for hyperphosphatemia     # HLD  c/w Lipitor 40 qhs    # DVT ppx: hep 5K q12    # GI ppx: none    # Activity: Independent     # Code: full    Dispo: f/u renal re HD; f/u Add Med/CATCH;   today, pt will go home w/ outpt EtOH rehab (1/24 @ 11am) and outpt psych eval for depression - f/u CM/CATCH - stable  
  50yo domiciled Male with hx of ESRD on HD, gout and HTN, and PPHx of depression and alcohol use disorder who presents from outpatient facility for inpatient detox. Addiction Medicine consulted for assistance with alcohol detoxication management.     #Moderate Alcohol withdrawal  - agree with currently librium taper, can discontinue last day of taper to allow for 3 day taper until Thurs 1/16. Patient responding well with low CIWA on current dosing.   - keep Mg>2, K>4, consider dialysis inpatient for patient since he missed a day on Tues while he was admitted.   - agree with high dose thiamine for 3 days given nutritional deficiency endorsed prior to admission and ocular dysfunction, continue oral folate and multivitamin  - hold benzodiazepines for sedation/respiratory depression    - please continue all PRN medications for supportive management of withdrawal including:  - hydroxyzine 25 mg q6h prn anxiety   - tylenol 650 mg q6h prn mild/moderate pain  - melatonin 3 mg nightly prn for insomnia  - CATCH team to assist with substance use aftercare options. Team evaluated inpatient options, however given patient requires dialysis, unable to locate an inpatient substance rehab facility that can provide dialysis services as well. Patient is amenable to outpatient treatment at [facility]. [facility] at [address] [place] [address]  ([phone]). Patient scheduled on 1/24 at 11AM.   - patient agreeable to MAT for AUD with naltrexone. Naltrexone is an opioid antagonist that is FDA approved for alcohol use disorder by targeting alcohol cravings and decreasing binge consumption and total number of drinking days, and has been effective for patients abstaining from alcohol. Please prescribe naltrexone 50 mg daily with be given with food.     #Depression  - patient will need formal assessment and treatment in outpatient [facility] clinic for concurring condition. Denies any active or passive SI or HI.     Thank you for this consult. Rest of care per primary. Addiction medicine will sign out. Please reach out with any questions or concerns via TEAMS.   
  50yo domiciled Male with hx of ESRD on HD, gout and HTN, and PPHx of depression and alcohol use disorder who presents from outpatient facility for inpatient detox. Addiction Medicine consulted for assistance with alcohol detoxication management.     #Moderate Alcohol withdrawal  - agree with currently librium taper, can discontinue last day of taper to allow for 3 day taper until Thurs 1/16. Patient responding well with low CIWA on current dosing.   - keep Mg>2, K>4, consider dialysis inpatient for patient since he missed a day on Tues while he was admitted.   - agree with high dose thiamine for 3 days given nutritional deficiency endorsed prior to admission and ocular dysfunction, continue oral folate and multivitamin  - hold benzodiazepines for sedation/respiratory depression    - please continue all PRN medications for supportive management of withdrawal including:  - hydroxyzine 25 mg q6h prn anxiety   - tylenol 650 mg q6h prn mild/moderate pain  - melatonin 3 mg nightly prn for insomnia  - CATCH team to assist with substance use aftercare options. Team evaluated inpatient options, however given patient requires dialysis, unable to locate an inpatient substance rehab facility that can provide dialysis services as well. Patient is amenable to outpatient treatment at [facility]. [facility] at [address] [place] [address]  ([phone]). Patient scheduled on 1/24 at 11AM.   - patient agreeable to MAT for AUD with naltrexone. Naltrexone is an opioid antagonist that is FDA approved for alcohol use disorder by targeting alcohol cravings and decreasing binge consumption and total number of drinking days, and has been effective for patients abstaining from alcohol. Please prescribe naltrexone 50 mg daily with be given with food.     #Depression  - patient will need formal assessment and treatment in outpatient [facility] clinic for concurring condition. Denies any active or passive SI or HI.     Thank you for this consult. Rest of care per primary. Addiction medicine will continue to follow. Please reach out with any questions or concerns via TEAMS.

## 2025-01-17 NOTE — DISCHARGE NOTE NURSING/CASE MANAGEMENT/SOCIAL WORK - NSTRANSFERBELONGINGSRESP_GEN_A_NUR
yes Price (Do Not Change): 0.00 Instructions: This plan will send the code FBSE to the PM system.  DO NOT or CHANGE the price. Detail Level: Generalized

## 2025-01-17 NOTE — DISCHARGE NOTE NURSING/CASE MANAGEMENT/SOCIAL WORK - PATIENT PORTAL LINK FT
You can access the FollowMyHealth Patient Portal offered by [facility] by registering at the following website: [email or web address]. By joining [employer]’s FollowMyHealth portal, you will also be able to view your health information using other applications (apps) compatible with our system.

## 2025-01-21 ENCOUNTER — APPOINTMENT (OUTPATIENT)
Dept: PSYCHIATRY | Facility: CLINIC | Age: 50
End: 2025-01-21

## 2025-01-22 DIAGNOSIS — F10.239 ALCOHOL DEPENDENCE WITH WITHDRAWAL, UNSPECIFIED: ICD-10-CM

## 2025-01-22 DIAGNOSIS — F32.A DEPRESSION, UNSPECIFIED: ICD-10-CM

## 2025-01-22 DIAGNOSIS — M10.9 GOUT, UNSPECIFIED: ICD-10-CM

## 2025-01-22 DIAGNOSIS — E78.5 HYPERLIPIDEMIA, UNSPECIFIED: ICD-10-CM

## 2025-01-22 DIAGNOSIS — E53.8 DEFICIENCY OF OTHER SPECIFIED B GROUP VITAMINS: ICD-10-CM
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
## 2025-01-22 DIAGNOSIS — I12.0 HYPERTENSIVE CHRONIC KIDNEY DISEASE WITH STAGE 5 CHRONIC KIDNEY DISEASE OR END STAGE RENAL DISEASE: ICD-10-CM

## 2025-01-22 DIAGNOSIS — Z79.899 OTHER LONG TERM (CURRENT) DRUG THERAPY: ICD-10-CM

## 2025-01-22 DIAGNOSIS — Z99.2 DEPENDENCE ON RENAL DIALYSIS: ICD-10-CM
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
## 2025-01-22 DIAGNOSIS — N18.6 END STAGE RENAL DISEASE: ICD-10-CM

## 2025-01-22 DIAGNOSIS — D84.81 IMMUNODEFICIENCY DUE TO CONDITIONS CLASSIFIED ELSEWHERE: ICD-10-CM

## 2025-01-22 DIAGNOSIS — E83.39 OTHER DISORDERS OF PHOSPHORUS METABOLISM: ICD-10-CM

## 2025-01-24 ENCOUNTER — OUTPATIENT (OUTPATIENT)
Dept: OUTPATIENT SERVICES | Facility: HOSPITAL | Age: 50
LOS: 1 days | End: 2025-01-24
Payer: MEDICARE

## 2025-01-24 ENCOUNTER — APPOINTMENT (OUTPATIENT)
Dept: PSYCHIATRY | Facility: CLINIC | Age: 50
End: 2025-01-24

## 2025-01-24 DIAGNOSIS — Z99.2 DEPENDENCE ON RENAL DIALYSIS: Chronic | ICD-10-CM

## 2025-01-24 DIAGNOSIS — F10.20 ALCOHOL DEPENDENCE, UNCOMPLICATED: ICD-10-CM

## 2025-01-24 PROCEDURE — 99409 AUDIT/DAST OVER 30 MIN: CPT

## 2025-01-25 DIAGNOSIS — F10.20 ALCOHOL DEPENDENCE, UNCOMPLICATED: ICD-10-CM

## 2025-01-27 ENCOUNTER — OUTPATIENT (OUTPATIENT)
Dept: OUTPATIENT SERVICES | Facility: HOSPITAL | Age: 50
LOS: 1 days | End: 2025-01-27
Payer: MEDICARE

## 2025-01-27 ENCOUNTER — APPOINTMENT (OUTPATIENT)
Dept: PSYCHIATRY | Facility: CLINIC | Age: 50
End: 2025-01-27

## 2025-01-27 DIAGNOSIS — F10.20 ALCOHOL DEPENDENCE, UNCOMPLICATED: ICD-10-CM

## 2025-01-27 DIAGNOSIS — Z99.2 DEPENDENCE ON RENAL DIALYSIS: Chronic | ICD-10-CM

## 2025-01-27 PROCEDURE — 90837 PSYTX W PT 60 MINUTES: CPT

## 2025-01-28 ENCOUNTER — APPOINTMENT (OUTPATIENT)
Dept: PSYCHIATRY | Facility: CLINIC | Age: 50
End: 2025-01-28

## 2025-01-28 DIAGNOSIS — F10.20 ALCOHOL DEPENDENCE, UNCOMPLICATED: ICD-10-CM

## 2025-01-29 ENCOUNTER — OUTPATIENT (OUTPATIENT)
Dept: OUTPATIENT SERVICES | Facility: HOSPITAL | Age: 50
LOS: 1 days | End: 2025-01-29
Payer: MEDICARE

## 2025-01-29 ENCOUNTER — APPOINTMENT (OUTPATIENT)
Dept: PSYCHIATRY | Facility: CLINIC | Age: 50
End: 2025-01-29
Payer: MEDICARE

## 2025-01-29 DIAGNOSIS — Z99.2 DEPENDENCE ON RENAL DIALYSIS: Chronic | ICD-10-CM

## 2025-01-29 DIAGNOSIS — F10.20 ALCOHOL DEPENDENCE, UNCOMPLICATED: ICD-10-CM

## 2025-01-29 PROCEDURE — 90792 PSYCH DIAG EVAL W/MED SRVCS: CPT

## 2025-01-30 DIAGNOSIS — F10.20 ALCOHOL DEPENDENCE, UNCOMPLICATED: ICD-10-CM

## 2025-02-03 ENCOUNTER — APPOINTMENT (OUTPATIENT)
Dept: PSYCHIATRY | Facility: CLINIC | Age: 50
End: 2025-02-03

## 2025-02-03 ENCOUNTER — EMERGENCY (EMERGENCY)
Facility: HOSPITAL | Age: 50
LOS: 0 days | Discharge: HOME | End: 2025-02-03
Attending: STUDENT IN AN ORGANIZED HEALTH CARE EDUCATION/TRAINING PROGRAM
Payer: MEDICARE

## 2025-02-03 ENCOUNTER — OUTPATIENT (OUTPATIENT)
Dept: OUTPATIENT SERVICES | Facility: HOSPITAL | Age: 50
LOS: 1 days | End: 2025-02-03
Payer: MEDICARE

## 2025-02-03 VITALS
TEMPERATURE: 98 F | DIASTOLIC BLOOD PRESSURE: 65 MMHG | HEART RATE: 80 BPM | SYSTOLIC BLOOD PRESSURE: 106 MMHG | RESPIRATION RATE: 16 BRPM | WEIGHT: 220.02 LBS | HEIGHT: 73 IN | OXYGEN SATURATION: 100 %

## 2025-02-03 DIAGNOSIS — F10.20 ALCOHOL DEPENDENCE, UNCOMPLICATED: ICD-10-CM

## 2025-02-03 DIAGNOSIS — M10.9 GOUT, UNSPECIFIED: ICD-10-CM

## 2025-02-03 DIAGNOSIS — Z99.2 DEPENDENCE ON RENAL DIALYSIS: Chronic | ICD-10-CM

## 2025-02-03 DIAGNOSIS — Z99.2 DEPENDENCE ON RENAL DIALYSIS: ICD-10-CM

## 2025-02-03 DIAGNOSIS — I12.0 HYPERTENSIVE CHRONIC KIDNEY DISEASE WITH STAGE 5 CHRONIC KIDNEY DISEASE OR END STAGE RENAL DISEASE: ICD-10-CM

## 2025-02-03 DIAGNOSIS — R31.9 HEMATURIA, UNSPECIFIED: ICD-10-CM

## 2025-02-03 DIAGNOSIS — N30.01 ACUTE CYSTITIS WITH HEMATURIA: ICD-10-CM

## 2025-02-03 DIAGNOSIS — N18.6 END STAGE RENAL DISEASE: ICD-10-CM

## 2025-02-03 LAB
ALBUMIN SERPL ELPH-MCNC: 4.4 G/DL — SIGNIFICANT CHANGE UP (ref 3.5–5.2)
ALP SERPL-CCNC: 62 U/L — SIGNIFICANT CHANGE UP (ref 30–115)
ALT FLD-CCNC: 9 U/L — SIGNIFICANT CHANGE UP (ref 0–41)
ANION GAP SERPL CALC-SCNC: 17 MMOL/L — HIGH (ref 7–14)
APPEARANCE UR: ABNORMAL
AST SERPL-CCNC: 33 U/L — SIGNIFICANT CHANGE UP (ref 0–41)
BASOPHILS # BLD AUTO: 0.05 K/UL — SIGNIFICANT CHANGE UP (ref 0–0.2)
BASOPHILS NFR BLD AUTO: 0.6 % — SIGNIFICANT CHANGE UP (ref 0–1)
BILIRUB SERPL-MCNC: 0.4 MG/DL — SIGNIFICANT CHANGE UP (ref 0.2–1.2)
BILIRUB UR-MCNC: NEGATIVE — SIGNIFICANT CHANGE UP
BUN SERPL-MCNC: 38 MG/DL — HIGH (ref 10–20)
CALCIUM SERPL-MCNC: 9.1 MG/DL — SIGNIFICANT CHANGE UP (ref 8.4–10.5)
CHLORIDE SERPL-SCNC: 93 MMOL/L — LOW (ref 98–110)
CO2 SERPL-SCNC: 23 MMOL/L — SIGNIFICANT CHANGE UP (ref 17–32)
COLOR SPEC: ABNORMAL
CREAT SERPL-MCNC: 9.8 MG/DL — CRITICAL HIGH (ref 0.7–1.5)
DIFF PNL FLD: ABNORMAL
EGFR: 6 ML/MIN/1.73M2 — LOW
EOSINOPHIL # BLD AUTO: 0.22 K/UL — SIGNIFICANT CHANGE UP (ref 0–0.7)
EOSINOPHIL NFR BLD AUTO: 2.7 % — SIGNIFICANT CHANGE UP (ref 0–8)
GAS PNL BLDV: SIGNIFICANT CHANGE UP
GLUCOSE SERPL-MCNC: 100 MG/DL — HIGH (ref 70–99)
GLUCOSE UR QL: NEGATIVE MG/DL — SIGNIFICANT CHANGE UP
HCT VFR BLD CALC: 34.3 % — LOW (ref 42–52)
HGB BLD-MCNC: 11 G/DL — LOW (ref 14–18)
IMM GRANULOCYTES NFR BLD AUTO: 0.2 % — SIGNIFICANT CHANGE UP (ref 0.1–0.3)
KETONES UR-MCNC: ABNORMAL MG/DL
LEUKOCYTE ESTERASE UR-ACNC: ABNORMAL
LYMPHOCYTES # BLD AUTO: 2.36 K/UL — SIGNIFICANT CHANGE UP (ref 1.2–3.4)
LYMPHOCYTES # BLD AUTO: 29.1 % — SIGNIFICANT CHANGE UP (ref 20.5–51.1)
MCHC RBC-ENTMCNC: 26.4 PG — LOW (ref 27–31)
MCHC RBC-ENTMCNC: 32.1 G/DL — SIGNIFICANT CHANGE UP (ref 32–37)
MCV RBC AUTO: 82.3 FL — SIGNIFICANT CHANGE UP (ref 80–94)
MONOCYTES # BLD AUTO: 0.56 K/UL — SIGNIFICANT CHANGE UP (ref 0.1–0.6)
MONOCYTES NFR BLD AUTO: 6.9 % — SIGNIFICANT CHANGE UP (ref 1.7–9.3)
NEUTROPHILS # BLD AUTO: 4.9 K/UL — SIGNIFICANT CHANGE UP (ref 1.4–6.5)
NEUTROPHILS NFR BLD AUTO: 60.5 % — SIGNIFICANT CHANGE UP (ref 42.2–75.2)
NITRITE UR-MCNC: NEGATIVE — SIGNIFICANT CHANGE UP
NRBC # BLD: 0 /100 WBCS — SIGNIFICANT CHANGE UP (ref 0–0)
NRBC BLD-RTO: 0 /100 WBCS — SIGNIFICANT CHANGE UP (ref 0–0)
PH UR: 7 — SIGNIFICANT CHANGE UP (ref 5–8)
PLATELET # BLD AUTO: 278 K/UL — SIGNIFICANT CHANGE UP (ref 130–400)
PMV BLD: 11.8 FL — HIGH (ref 7.4–10.4)
POTASSIUM SERPL-MCNC: 5.7 MMOL/L — HIGH (ref 3.5–5)
POTASSIUM SERPL-SCNC: 5.7 MMOL/L — HIGH (ref 3.5–5)
PROT SERPL-MCNC: 7.4 G/DL — SIGNIFICANT CHANGE UP (ref 6–8)
PROT UR-MCNC: 300 MG/DL
RBC # BLD: 4.17 M/UL — LOW (ref 4.7–6.1)
RBC # FLD: 16 % — HIGH (ref 11.5–14.5)
SODIUM SERPL-SCNC: 133 MMOL/L — LOW (ref 135–146)
SP GR SPEC: 1.02 — SIGNIFICANT CHANGE UP (ref 1–1.03)
UROBILINOGEN FLD QL: 1 MG/DL — SIGNIFICANT CHANGE UP (ref 0.2–1)
WBC # BLD: 8.11 K/UL — SIGNIFICANT CHANGE UP (ref 4.8–10.8)
WBC # FLD AUTO: 8.11 K/UL — SIGNIFICANT CHANGE UP (ref 4.8–10.8)

## 2025-02-03 PROCEDURE — 82330 ASSAY OF CALCIUM: CPT

## 2025-02-03 PROCEDURE — 99284 EMERGENCY DEPT VISIT MOD MDM: CPT | Mod: 25

## 2025-02-03 PROCEDURE — 87086 URINE CULTURE/COLONY COUNT: CPT

## 2025-02-03 PROCEDURE — 99284 EMERGENCY DEPT VISIT MOD MDM: CPT

## 2025-02-03 PROCEDURE — H0038: CPT

## 2025-02-03 PROCEDURE — 85014 HEMATOCRIT: CPT

## 2025-02-03 PROCEDURE — 80053 COMPREHEN METABOLIC PANEL: CPT

## 2025-02-03 PROCEDURE — 90834 PSYTX W PT 45 MINUTES: CPT

## 2025-02-03 PROCEDURE — 83605 ASSAY OF LACTIC ACID: CPT

## 2025-02-03 PROCEDURE — 74176 CT ABD & PELVIS W/O CONTRAST: CPT | Mod: 26

## 2025-02-03 PROCEDURE — 85025 COMPLETE CBC W/AUTO DIFF WBC: CPT

## 2025-02-03 PROCEDURE — 96374 THER/PROPH/DIAG INJ IV PUSH: CPT

## 2025-02-03 PROCEDURE — 74176 CT ABD & PELVIS W/O CONTRAST: CPT | Mod: MC

## 2025-02-03 PROCEDURE — 84132 ASSAY OF SERUM POTASSIUM: CPT

## 2025-02-03 PROCEDURE — 84295 ASSAY OF SERUM SODIUM: CPT

## 2025-02-03 PROCEDURE — 36415 COLL VENOUS BLD VENIPUNCTURE: CPT

## 2025-02-03 PROCEDURE — 76770 US EXAM ABDO BACK WALL COMP: CPT

## 2025-02-03 PROCEDURE — 85018 HEMOGLOBIN: CPT

## 2025-02-03 PROCEDURE — 82803 BLOOD GASES ANY COMBINATION: CPT

## 2025-02-03 PROCEDURE — 81001 URINALYSIS AUTO W/SCOPE: CPT

## 2025-02-03 PROCEDURE — 76770 US EXAM ABDO BACK WALL COMP: CPT | Mod: 26

## 2025-02-03 RX ORDER — CEFTRIAXONE 250 MG/1
1000 INJECTION, POWDER, FOR SOLUTION INTRAMUSCULAR; INTRAVENOUS ONCE
Refills: 0 | Status: COMPLETED | OUTPATIENT
Start: 2025-02-03 | End: 2025-02-03

## 2025-02-03 RX ORDER — SODIUM CHLORIDE 9 G/ML
1000 INJECTION, SOLUTION INTRAVENOUS ONCE
Refills: 0 | Status: COMPLETED | OUTPATIENT
Start: 2025-02-03 | End: 2025-02-03

## 2025-02-03 RX ORDER — CEFPODOXIME PROXETIL 200 MG
1 TABLET ORAL
Qty: 20 | Refills: 0
Start: 2025-02-03 | End: 2025-02-12

## 2025-02-03 RX ADMIN — SODIUM CHLORIDE 1000 MILLILITER(S): 9 INJECTION, SOLUTION INTRAVENOUS at 13:20

## 2025-02-03 RX ADMIN — CEFTRIAXONE 100 MILLIGRAM(S): 250 INJECTION, POWDER, FOR SOLUTION INTRAMUSCULAR; INTRAVENOUS at 16:30

## 2025-02-03 NOTE — ED PROVIDER NOTE - PATIENT PORTAL LINK FT
You can access the FollowMyHealth Patient Portal offered by [facility] by registering at the following website: [email or web address]. By joining [employer]’s FollowMyHealth portal, you will also be able to view your health information using other applications (apps) compatible with our system.

## 2025-02-03 NOTE — ED PROVIDER NOTE - OBJECTIVE STATEMENT
Patient is a 49-year-old male PMH CKD on dialysis TTS, gout, HTN, EtOH use disorder currently undergoing outpatient detox sober 2 weeks, who presents ED with complaints of hematuria that began 1 week ago.  Patient endorses similar episode of betsy hematuria in 2021; at that time found to have chronic kidney disease, patient was supposed to go for outpatient biopsy as per chart review but never did.  Patient endorses mild pain at the penile shaft with urination as well as hematuria but no passage of clots.  Denies fever, chills, CP, SOB, flank pain, abdominal pain, nausea, vomiting, diarrhea, penile discharge, testicular pain, or trauma to the area.  Patient denies sexual activity.

## 2025-02-03 NOTE — ED PROVIDER NOTE - PROGRESS NOTE DETAILS
CR: Discussed all results with patient.  First dose antibiotics given in the ED.  Antibiotic sent to pharmacy.  Patient voiced understanding with return precautions.  Will give rapid referral to urology.

## 2025-02-03 NOTE — ED PROVIDER NOTE - NSPTACCESSSVCSAPPTDETAILS_ED_ALL_ED_FT
Schedule lab referral for patient with urology, found to have acute cystitis with hematuria in the ED

## 2025-02-03 NOTE — ED PROVIDER NOTE - ATTENDING APP SHARED VISIT CONTRIBUTION OF CARE
50 yo m hx esrd on hd (TTSa), recent admission for etoh detox (dc ~2 wks ago)  pt presents for 1 week of gross hematuria w/ pain to penile shaft. no clots, abd pain, flank pain. pt states he had similar hematuria in the past but did not get complete workup.      vss  gen- NAD, aaox3  card-rrr  lungs-ctab, no wheezing or rhonchi  abd-sntnd, no guarding or rebound, no cvat   neuro- full str/sensation, cn ii-xii grossly intact, normal coordination 48 yo m hx esrd on hd (TTSa), recent admission for etoh detox (dc ~2 wks ago)  pt presents for 1 week of gross hematuria w/ dysuria. pain increases throughout urinary stream. testicle pain/swelling/discharge. pt not sexually active for ~3 yrs.  no clots, abd pain, flank pain. pt states he had similar hematuria in the past which is when his ckd was discovered     vss  gen- NAD, aaox3  card-rrr  lungs-ctab, no wheezing or rhonchi  abd-sntnd, no guarding or rebound, no cvat   neuro- full str/sensation, cn ii-xii grossly intact, normal coordination

## 2025-02-03 NOTE — ED PROVIDER NOTE - NSFOLLOWUPINSTRUCTIONS_ED_ALL_ED_FT
BRING AND DISCUSS ALL RESULTS WITH YOUR NEPHROLOGIST WITH DR. MORRELL AT YOUR NEXT APPOINTMENT.     Our Emergency Department Referral Coordinators will be reaching out to you in the next 24-48 hours from 9:00am to 5:00pm with a follow up appointment. Please expect a phone call from the hospital in that time frame. If you do not receive a call or if you have any questions or concerns, you can reach them at   [phone].     Urinary tract infections ("UTIs") are infections that affect either the bladder or the kidneys: Bladder infections are more common than kidney infections. They happen when bacteria get into the urethra and travel up into the bladder. The medical term for bladder infection is "cystitis." Most of the time, when people talk about a UTI, they mean a bladder infection. Kidney infections happen when the bacteria travel even higher, up into the kidneys. The medical term for kidney infection is "pyelonephritis." This is more serious than a bladder infection, and can lead to other serious problems if it is not treated properly.    What are the symptoms of a bladder infection? The symptoms include: pain or a burning feeling when you urinate, the need to urinate often, the need to urinate suddenly or in a hurry, blood in the urine    What are the symptoms of a kidney infection? The symptoms of a kidney infection can include the same urinary symptoms that happen with a bladder infection. In addition, kidney infections can cause: fever, back pain, nausea or vomiting.     How do I care for myself at home? Ask the doctor or nurse what you should do when you go home. Make sure that you understand exactly what you need to do to care for yourself. Ask questions if there is anything you do not understand.    You should also:  ?Take all of your medicines as instructed.  ?Take phenazopyridine (sample brand name: AZO Urinary Pain Relief) for the first day or so, if you choose. This is an over-the-counter medicine. It will help numb your bladder and decrease the urge to urinate. This medicine causes your urine and tears to look orange.  ?Take acetaminophen (sample brand name: Tylenol) if needed for pain.  ?Drink extra fluids.       When should I call the doctor?  Call for advice if:  ?You have pain in your back, shoulder, or belly.  ?You have a fever, shaking chills, or sweats even though you are taking antibiotics.  ?You notice more blood in your urine.  ?Your symptoms get worse or do not get better within 24 hours of starting antibiotics.  ?Your symptoms come back after finishing treatment.  ?You have any new or worrying symptoms.    Hematuria, Adult  Hematuria is blood in the urine. Blood may be visible in the urine, or it may be identified with a test. This condition can be caused by infections of the bladder, urethra, kidney, or prostate. Other possible causes include:  Kidney stones.  Cancer of the urinary tract.  Too much calcium in the urine.  Conditions that are passed from parent to child (inherited conditions).   Exercise that requires a lot of energy.  Infections can usually be treated with medicine, and a kidney stone usually will pass through your urine. If neither of these is the cause of your hematuria, more tests may be needed to identify the cause of your symptoms.    It is very important to tell your health care provider about any blood in your urine, even if it is painless or the blood stops without treatment. Blood in the urine, when it happens and then stops and then happens again, can be a symptom of a very serious condition, including cancer. There is no pain in the initial stages of many urinary cancers.    Follow these instructions at home:  Medicines     Take over-the-counter and prescription medicines only as told by your health care provider.  If you were prescribed an antibiotic medicine, take it as told by your health care provider. Do not stop taking the antibiotic even if you start to feel better.  Eating and drinking     Drink enough fluid to keep your urine clear or pale yellow. It is recommended that you drink 3–4 quarts (2.8–3.8 L) a day. If you have been diagnosed with an infection, it is recommended that you drink cranberry juice in addition to large amounts of water.  Avoid caffeine, tea, and carbonated beverages. These tend to irritate the bladder.  Avoid alcohol because it may irritate the prostate (men).  General instructions     If you have been diagnosed with a kidney stone, follow your health care provider's instructions about straining your urine to catch the stone.  Empty your bladder often. Avoid holding urine for long periods of time.  If you are female:  After a bowel movement, wipe from front to back and use each piece of toilet paper only once.  Empty your bladder before and after sex.  Pay attention to any changes in your symptoms. Tell your health care provider about any changes or any new symptoms.  It is your responsibility to get your test results. Ask your health care provider, or the department performing the test, when your results will be ready.  Keep all follow-up visits as told by your health care provider. This is important.  Contact a health care provider if:  You develop back pain.  You have a fever.  You have nausea or vomiting.  Your symptoms do not improve after 3 days.  Your symptoms get worse.  Get help right away if:  You develop severe vomiting and are unable take medicine without vomiting.  You develop severe pain in your back or abdomen even though you are taking medicine.  You pass a large amount of blood in your urine.  You pass blood clots in your urine.  You feel very weak or like you might faint.  You faint.  Summary  Hematuria is blood in the urine. It has many possible causes.  It is very important that you tell your health care provider about any blood in your urine, even if it is painless or the blood stops without treatment.  Take over-the-counter and prescription medicines only as told by your health care provider.  Drink enough fluid to keep your urine clear or pale yellow.  This information is not intended to replace advice given to you by your health care provider. Make sure you discuss any questions you have with your health care provider.

## 2025-02-03 NOTE — ED PROVIDER NOTE - PHYSICAL EXAMINATION
VITAL SIGNS: I have reviewed nursing notes and confirm.  CONSTITUTIONAL: In no acute distress.  SKIN: Skin exam is warm and dry  HEAD: Normocephalic; atraumatic.  EYES: PERRL, EOM intact; conjunctiva and sclera clear.  ENT: No nasal discharge; airway clear.  NECK: Supple; non tender.  CARD: S1, S2; Regular rate and rhythm.  RESP: CTAB. Speaking in full sentences.   ABD: Normal bowel sounds; soft; non-distended; non-tender; No rebound or guarding. No CVA tenderness.  EXT: Normal ROM. No LE edema.   NEURO: Alert, oriented. Grossly unremarkable. No focal deficits.

## 2025-02-03 NOTE — ED PROVIDER NOTE - CLINICAL SUMMARY MEDICAL DECISION MAKING FREE TEXT BOX
Throughout ED observation period, pt remained clinically and hemodynamically stable.  Lab results as interpreted by me- mild anemia, no significant electrolyte abnormalities, known esrd  ua positive  ctap w/o acute findings, no kidney stone  advised nephro f/u, abx, return precautions

## 2025-02-04 DIAGNOSIS — F10.20 ALCOHOL DEPENDENCE, UNCOMPLICATED: ICD-10-CM

## 2025-02-04 LAB
CULTURE RESULTS: SIGNIFICANT CHANGE UP
SPECIMEN SOURCE: SIGNIFICANT CHANGE UP

## 2025-02-04 NOTE — CHART NOTE - NSCHARTNOTEFT_GEN_A_CORE
[facility] MRN [id number] / Declined services 2/4 - JL    SPECIALTY: urology

## 2025-02-10 ENCOUNTER — APPOINTMENT (OUTPATIENT)
Dept: PSYCHIATRY | Facility: CLINIC | Age: 50
End: 2025-02-10

## 2025-02-10 ENCOUNTER — OUTPATIENT (OUTPATIENT)
Dept: OUTPATIENT SERVICES | Facility: HOSPITAL | Age: 50
LOS: 1 days | End: 2025-02-10
Payer: MEDICARE

## 2025-02-10 DIAGNOSIS — F10.20 ALCOHOL DEPENDENCE, UNCOMPLICATED: ICD-10-CM

## 2025-02-10 DIAGNOSIS — Z99.2 DEPENDENCE ON RENAL DIALYSIS: Chronic | ICD-10-CM

## 2025-02-10 PROCEDURE — 90834 PSYTX W PT 45 MINUTES: CPT

## 2025-02-11 DIAGNOSIS — F10.20 ALCOHOL DEPENDENCE, UNCOMPLICATED: ICD-10-CM

## 2025-02-21 ENCOUNTER — APPOINTMENT (OUTPATIENT)
Dept: PSYCHIATRY | Facility: CLINIC | Age: 50
End: 2025-02-21

## 2025-02-21 ENCOUNTER — OUTPATIENT (OUTPATIENT)
Dept: OUTPATIENT SERVICES | Facility: HOSPITAL | Age: 50
LOS: 1 days | End: 2025-02-21
Payer: MEDICARE

## 2025-02-21 DIAGNOSIS — Z99.2 DEPENDENCE ON RENAL DIALYSIS: Chronic | ICD-10-CM

## 2025-02-21 DIAGNOSIS — F10.20 ALCOHOL DEPENDENCE, UNCOMPLICATED: ICD-10-CM

## 2025-02-21 PROCEDURE — 90834 PSYTX W PT 45 MINUTES: CPT

## 2025-02-22 DIAGNOSIS — F10.20 ALCOHOL DEPENDENCE, UNCOMPLICATED: ICD-10-CM

## 2025-02-24 ENCOUNTER — APPOINTMENT (OUTPATIENT)
Dept: PSYCHIATRY | Facility: CLINIC | Age: 50
End: 2025-02-24

## 2025-02-26 ENCOUNTER — OUTPATIENT (OUTPATIENT)
Dept: OUTPATIENT SERVICES | Facility: HOSPITAL | Age: 50
LOS: 1 days | End: 2025-02-26
Payer: MEDICARE

## 2025-02-26 ENCOUNTER — APPOINTMENT (OUTPATIENT)
Dept: PSYCHIATRY | Facility: CLINIC | Age: 50
End: 2025-02-26
Payer: MEDICARE

## 2025-02-26 DIAGNOSIS — Z99.2 DEPENDENCE ON RENAL DIALYSIS: Chronic | ICD-10-CM

## 2025-02-26 DIAGNOSIS — F10.20 ALCOHOL DEPENDENCE, UNCOMPLICATED: ICD-10-CM

## 2025-02-26 PROCEDURE — 99215 OFFICE O/P EST HI 40 MIN: CPT

## 2025-02-27 DIAGNOSIS — F10.20 ALCOHOL DEPENDENCE, UNCOMPLICATED: ICD-10-CM

## 2025-03-03 ENCOUNTER — APPOINTMENT (OUTPATIENT)
Dept: PSYCHIATRY | Facility: CLINIC | Age: 50
End: 2025-03-03

## 2025-03-03 ENCOUNTER — OUTPATIENT (OUTPATIENT)
Dept: OUTPATIENT SERVICES | Facility: HOSPITAL | Age: 50
LOS: 1 days | End: 2025-03-03
Payer: MEDICARE

## 2025-03-03 DIAGNOSIS — F10.20 ALCOHOL DEPENDENCE, UNCOMPLICATED: ICD-10-CM

## 2025-03-03 DIAGNOSIS — Z99.2 DEPENDENCE ON RENAL DIALYSIS: Chronic | ICD-10-CM

## 2025-03-03 PROCEDURE — 90834 PSYTX W PT 45 MINUTES: CPT

## 2025-03-04 DIAGNOSIS — F10.20 ALCOHOL DEPENDENCE, UNCOMPLICATED: ICD-10-CM

## 2025-03-10 ENCOUNTER — APPOINTMENT (OUTPATIENT)
Dept: PSYCHIATRY | Facility: CLINIC | Age: 50
End: 2025-03-10

## 2025-03-10 ENCOUNTER — OUTPATIENT (OUTPATIENT)
Dept: OUTPATIENT SERVICES | Facility: HOSPITAL | Age: 50
LOS: 1 days | End: 2025-03-10
Payer: MEDICARE

## 2025-03-10 DIAGNOSIS — F10.20 ALCOHOL DEPENDENCE, UNCOMPLICATED: ICD-10-CM

## 2025-03-10 DIAGNOSIS — Z99.2 DEPENDENCE ON RENAL DIALYSIS: Chronic | ICD-10-CM

## 2025-03-10 PROCEDURE — 90834 PSYTX W PT 45 MINUTES: CPT

## 2025-03-11 DIAGNOSIS — F10.20 ALCOHOL DEPENDENCE, UNCOMPLICATED: ICD-10-CM

## 2025-03-17 ENCOUNTER — OUTPATIENT (OUTPATIENT)
Dept: OUTPATIENT SERVICES | Facility: HOSPITAL | Age: 50
LOS: 1 days | End: 2025-03-17
Payer: MEDICARE

## 2025-03-17 ENCOUNTER — APPOINTMENT (OUTPATIENT)
Dept: PSYCHIATRY | Facility: CLINIC | Age: 50
End: 2025-03-17

## 2025-03-17 DIAGNOSIS — Z99.2 DEPENDENCE ON RENAL DIALYSIS: Chronic | ICD-10-CM

## 2025-03-17 DIAGNOSIS — F10.20 ALCOHOL DEPENDENCE, UNCOMPLICATED: ICD-10-CM

## 2025-03-17 PROCEDURE — 90834 PSYTX W PT 45 MINUTES: CPT

## 2025-03-18 DIAGNOSIS — F10.20 ALCOHOL DEPENDENCE, UNCOMPLICATED: ICD-10-CM

## 2025-03-24 ENCOUNTER — OUTPATIENT (OUTPATIENT)
Dept: OUTPATIENT SERVICES | Facility: HOSPITAL | Age: 50
LOS: 1 days | End: 2025-03-24
Payer: MEDICARE

## 2025-03-24 ENCOUNTER — APPOINTMENT (OUTPATIENT)
Dept: PSYCHIATRY | Facility: CLINIC | Age: 50
End: 2025-03-24

## 2025-03-24 DIAGNOSIS — F10.20 ALCOHOL DEPENDENCE, UNCOMPLICATED: ICD-10-CM

## 2025-03-24 DIAGNOSIS — Z99.2 DEPENDENCE ON RENAL DIALYSIS: Chronic | ICD-10-CM

## 2025-03-24 PROCEDURE — 90837 PSYTX W PT 60 MINUTES: CPT

## 2025-03-25 DIAGNOSIS — F10.20 ALCOHOL DEPENDENCE, UNCOMPLICATED: ICD-10-CM

## 2025-03-31 ENCOUNTER — APPOINTMENT (OUTPATIENT)
Dept: PSYCHIATRY | Facility: CLINIC | Age: 50
End: 2025-03-31

## 2025-03-31 ENCOUNTER — OUTPATIENT (OUTPATIENT)
Dept: OUTPATIENT SERVICES | Facility: HOSPITAL | Age: 50
LOS: 1 days | End: 2025-03-31
Payer: MEDICARE

## 2025-03-31 DIAGNOSIS — Z99.2 DEPENDENCE ON RENAL DIALYSIS: Chronic | ICD-10-CM

## 2025-03-31 DIAGNOSIS — F10.20 ALCOHOL DEPENDENCE, UNCOMPLICATED: ICD-10-CM

## 2025-03-31 PROCEDURE — H0038: CPT

## 2025-04-01 DIAGNOSIS — F10.20 ALCOHOL DEPENDENCE, UNCOMPLICATED: ICD-10-CM

## 2025-04-07 ENCOUNTER — APPOINTMENT (OUTPATIENT)
Dept: PSYCHIATRY | Facility: CLINIC | Age: 50
End: 2025-04-07

## 2025-04-07 ENCOUNTER — OUTPATIENT (OUTPATIENT)
Dept: OUTPATIENT SERVICES | Facility: HOSPITAL | Age: 50
LOS: 1 days | End: 2025-04-07
Payer: MEDICARE

## 2025-04-07 DIAGNOSIS — F10.20 ALCOHOL DEPENDENCE, UNCOMPLICATED: ICD-10-CM

## 2025-04-07 DIAGNOSIS — Z99.2 DEPENDENCE ON RENAL DIALYSIS: Chronic | ICD-10-CM

## 2025-04-07 PROCEDURE — 90834 PSYTX W PT 45 MINUTES: CPT

## 2025-04-08 DIAGNOSIS — F10.20 ALCOHOL DEPENDENCE, UNCOMPLICATED: ICD-10-CM

## 2025-04-09 ENCOUNTER — OUTPATIENT (OUTPATIENT)
Dept: OUTPATIENT SERVICES | Facility: HOSPITAL | Age: 50
LOS: 1 days | End: 2025-04-09
Payer: MEDICARE

## 2025-04-09 ENCOUNTER — APPOINTMENT (OUTPATIENT)
Dept: PSYCHIATRY | Facility: CLINIC | Age: 50
End: 2025-04-09
Payer: MEDICARE

## 2025-04-09 DIAGNOSIS — Z99.2 DEPENDENCE ON RENAL DIALYSIS: Chronic | ICD-10-CM

## 2025-04-09 DIAGNOSIS — F10.20 ALCOHOL DEPENDENCE, UNCOMPLICATED: ICD-10-CM

## 2025-04-09 PROCEDURE — 99214 OFFICE O/P EST MOD 30 MIN: CPT

## 2025-04-10 DIAGNOSIS — F10.20 ALCOHOL DEPENDENCE, UNCOMPLICATED: ICD-10-CM

## 2025-04-17 ENCOUNTER — APPOINTMENT (OUTPATIENT)
Dept: PSYCHIATRY | Facility: CLINIC | Age: 50
End: 2025-04-17

## 2025-04-17 ENCOUNTER — OUTPATIENT (OUTPATIENT)
Dept: OUTPATIENT SERVICES | Facility: HOSPITAL | Age: 50
LOS: 1 days | End: 2025-04-17
Payer: MEDICARE

## 2025-04-17 DIAGNOSIS — Z99.2 DEPENDENCE ON RENAL DIALYSIS: Chronic | ICD-10-CM

## 2025-04-17 DIAGNOSIS — F10.20 ALCOHOL DEPENDENCE, UNCOMPLICATED: ICD-10-CM

## 2025-04-17 PROCEDURE — 90834 PSYTX W PT 45 MINUTES: CPT

## 2025-04-18 DIAGNOSIS — F10.20 ALCOHOL DEPENDENCE, UNCOMPLICATED: ICD-10-CM

## 2025-04-21 ENCOUNTER — APPOINTMENT (OUTPATIENT)
Dept: PSYCHIATRY | Facility: CLINIC | Age: 50
End: 2025-04-21

## 2025-04-21 ENCOUNTER — OUTPATIENT (OUTPATIENT)
Dept: OUTPATIENT SERVICES | Facility: HOSPITAL | Age: 50
LOS: 1 days | End: 2025-04-21
Payer: MEDICARE

## 2025-04-21 DIAGNOSIS — F10.20 ALCOHOL DEPENDENCE, UNCOMPLICATED: ICD-10-CM

## 2025-04-21 DIAGNOSIS — Z99.2 DEPENDENCE ON RENAL DIALYSIS: Chronic | ICD-10-CM

## 2025-04-21 PROCEDURE — 90834 PSYTX W PT 45 MINUTES: CPT

## 2025-04-22 DIAGNOSIS — F10.20 ALCOHOL DEPENDENCE, UNCOMPLICATED: ICD-10-CM

## 2025-04-23 ENCOUNTER — APPOINTMENT (OUTPATIENT)
Dept: PSYCHIATRY | Facility: CLINIC | Age: 50
End: 2025-04-23

## 2025-04-23 ENCOUNTER — OUTPATIENT (OUTPATIENT)
Dept: OUTPATIENT SERVICES | Facility: HOSPITAL | Age: 50
LOS: 1 days | End: 2025-04-23
Payer: MEDICARE

## 2025-04-23 DIAGNOSIS — Z99.2 DEPENDENCE ON RENAL DIALYSIS: Chronic | ICD-10-CM

## 2025-04-23 DIAGNOSIS — F10.20 ALCOHOL DEPENDENCE, UNCOMPLICATED: ICD-10-CM

## 2025-04-23 PROCEDURE — 90847 FAMILY PSYTX W/PT 50 MIN: CPT

## 2025-04-24 DIAGNOSIS — F10.20 ALCOHOL DEPENDENCE, UNCOMPLICATED: ICD-10-CM

## 2025-04-28 ENCOUNTER — APPOINTMENT (OUTPATIENT)
Dept: PSYCHIATRY | Facility: CLINIC | Age: 50
End: 2025-04-28

## 2025-04-28 ENCOUNTER — OUTPATIENT (OUTPATIENT)
Dept: OUTPATIENT SERVICES | Facility: HOSPITAL | Age: 50
LOS: 1 days | End: 2025-04-28
Payer: MEDICARE

## 2025-04-28 DIAGNOSIS — Z99.2 DEPENDENCE ON RENAL DIALYSIS: Chronic | ICD-10-CM

## 2025-04-28 DIAGNOSIS — F10.20 ALCOHOL DEPENDENCE, UNCOMPLICATED: ICD-10-CM

## 2025-04-28 PROCEDURE — 90834 PSYTX W PT 45 MINUTES: CPT

## 2025-04-29 DIAGNOSIS — F10.20 ALCOHOL DEPENDENCE, UNCOMPLICATED: ICD-10-CM

## 2025-04-30 ENCOUNTER — OUTPATIENT (OUTPATIENT)
Dept: OUTPATIENT SERVICES | Facility: HOSPITAL | Age: 50
LOS: 1 days | End: 2025-04-30
Payer: MEDICARE

## 2025-04-30 ENCOUNTER — APPOINTMENT (OUTPATIENT)
Dept: PSYCHIATRY | Facility: CLINIC | Age: 50
End: 2025-04-30

## 2025-04-30 DIAGNOSIS — F10.20 ALCOHOL DEPENDENCE, UNCOMPLICATED: ICD-10-CM

## 2025-04-30 DIAGNOSIS — Z99.2 DEPENDENCE ON RENAL DIALYSIS: Chronic | ICD-10-CM

## 2025-04-30 PROCEDURE — 90847 FAMILY PSYTX W/PT 50 MIN: CPT

## 2025-05-01 DIAGNOSIS — F10.20 ALCOHOL DEPENDENCE, UNCOMPLICATED: ICD-10-CM

## 2025-05-05 ENCOUNTER — APPOINTMENT (OUTPATIENT)
Dept: PSYCHIATRY | Facility: CLINIC | Age: 50
End: 2025-05-05

## 2025-05-05 ENCOUNTER — OUTPATIENT (OUTPATIENT)
Dept: OUTPATIENT SERVICES | Facility: HOSPITAL | Age: 50
LOS: 1 days | End: 2025-05-05
Payer: MEDICARE

## 2025-05-05 DIAGNOSIS — Z99.2 DEPENDENCE ON RENAL DIALYSIS: Chronic | ICD-10-CM

## 2025-05-05 DIAGNOSIS — F10.20 ALCOHOL DEPENDENCE, UNCOMPLICATED: ICD-10-CM

## 2025-05-05 PROCEDURE — 90834 PSYTX W PT 45 MINUTES: CPT

## 2025-05-06 DIAGNOSIS — F10.20 ALCOHOL DEPENDENCE, UNCOMPLICATED: ICD-10-CM

## 2025-05-07 ENCOUNTER — APPOINTMENT (OUTPATIENT)
Dept: PSYCHIATRY | Facility: CLINIC | Age: 50
End: 2025-05-07

## 2025-05-07 ENCOUNTER — OUTPATIENT (OUTPATIENT)
Dept: OUTPATIENT SERVICES | Facility: HOSPITAL | Age: 50
LOS: 1 days | End: 2025-05-07
Payer: MEDICARE

## 2025-05-07 DIAGNOSIS — Z99.2 DEPENDENCE ON RENAL DIALYSIS: Chronic | ICD-10-CM

## 2025-05-07 DIAGNOSIS — F10.20 ALCOHOL DEPENDENCE, UNCOMPLICATED: ICD-10-CM

## 2025-05-07 PROCEDURE — 90847 FAMILY PSYTX W/PT 50 MIN: CPT

## 2025-05-08 DIAGNOSIS — F10.20 ALCOHOL DEPENDENCE, UNCOMPLICATED: ICD-10-CM

## 2025-05-12 ENCOUNTER — APPOINTMENT (OUTPATIENT)
Dept: PSYCHIATRY | Facility: CLINIC | Age: 50
End: 2025-05-12

## 2025-05-12 ENCOUNTER — OUTPATIENT (OUTPATIENT)
Dept: OUTPATIENT SERVICES | Facility: HOSPITAL | Age: 50
LOS: 1 days | End: 2025-05-12
Payer: MEDICARE

## 2025-05-12 DIAGNOSIS — Z99.2 DEPENDENCE ON RENAL DIALYSIS: Chronic | ICD-10-CM

## 2025-05-12 DIAGNOSIS — F10.20 ALCOHOL DEPENDENCE, UNCOMPLICATED: ICD-10-CM

## 2025-05-12 PROCEDURE — 90834 PSYTX W PT 45 MINUTES: CPT

## 2025-05-13 DIAGNOSIS — F10.20 ALCOHOL DEPENDENCE, UNCOMPLICATED: ICD-10-CM

## 2025-05-14 ENCOUNTER — APPOINTMENT (OUTPATIENT)
Dept: PSYCHIATRY | Facility: CLINIC | Age: 50
End: 2025-05-14

## 2025-05-21 ENCOUNTER — APPOINTMENT (OUTPATIENT)
Dept: PSYCHIATRY | Facility: CLINIC | Age: 50
End: 2025-05-21

## 2025-05-23 ENCOUNTER — APPOINTMENT (OUTPATIENT)
Dept: PSYCHIATRY | Facility: CLINIC | Age: 50
End: 2025-05-23

## 2025-05-28 ENCOUNTER — OUTPATIENT (OUTPATIENT)
Dept: OUTPATIENT SERVICES | Facility: HOSPITAL | Age: 50
LOS: 1 days | End: 2025-05-28
Payer: MEDICARE

## 2025-05-28 ENCOUNTER — APPOINTMENT (OUTPATIENT)
Dept: PSYCHIATRY | Facility: CLINIC | Age: 50
End: 2025-05-28

## 2025-05-28 DIAGNOSIS — F10.20 ALCOHOL DEPENDENCE, UNCOMPLICATED: ICD-10-CM

## 2025-05-28 DIAGNOSIS — Z99.2 DEPENDENCE ON RENAL DIALYSIS: Chronic | ICD-10-CM

## 2025-05-28 PROCEDURE — 90847 FAMILY PSYTX W/PT 50 MIN: CPT

## 2025-05-28 PROCEDURE — 90834 PSYTX W PT 45 MINUTES: CPT

## 2025-05-29 DIAGNOSIS — F10.20 ALCOHOL DEPENDENCE, UNCOMPLICATED: ICD-10-CM

## 2025-06-04 ENCOUNTER — OUTPATIENT (OUTPATIENT)
Dept: OUTPATIENT SERVICES | Facility: HOSPITAL | Age: 50
LOS: 1 days | End: 2025-06-04
Payer: MEDICARE

## 2025-06-04 ENCOUNTER — APPOINTMENT (OUTPATIENT)
Dept: PSYCHIATRY | Facility: CLINIC | Age: 50
End: 2025-06-04
Payer: MEDICARE

## 2025-06-04 DIAGNOSIS — F10.20 ALCOHOL DEPENDENCE, UNCOMPLICATED: ICD-10-CM

## 2025-06-04 DIAGNOSIS — Z99.2 DEPENDENCE ON RENAL DIALYSIS: Chronic | ICD-10-CM

## 2025-06-04 PROCEDURE — 99214 OFFICE O/P EST MOD 30 MIN: CPT

## 2025-06-04 PROCEDURE — 99214 OFFICE O/P EST MOD 30 MIN: CPT | Mod: 95,25

## 2025-06-04 PROCEDURE — 90847 FAMILY PSYTX W/PT 50 MIN: CPT

## 2025-06-05 DIAGNOSIS — F10.20 ALCOHOL DEPENDENCE, UNCOMPLICATED: ICD-10-CM

## 2025-06-11 ENCOUNTER — OUTPATIENT (OUTPATIENT)
Dept: OUTPATIENT SERVICES | Facility: HOSPITAL | Age: 50
LOS: 1 days | End: 2025-06-11
Payer: MEDICARE

## 2025-06-11 ENCOUNTER — APPOINTMENT (OUTPATIENT)
Dept: PSYCHIATRY | Facility: CLINIC | Age: 50
End: 2025-06-11

## 2025-06-11 DIAGNOSIS — F10.20 ALCOHOL DEPENDENCE, UNCOMPLICATED: ICD-10-CM

## 2025-06-11 DIAGNOSIS — Z99.2 DEPENDENCE ON RENAL DIALYSIS: Chronic | ICD-10-CM

## 2025-06-11 PROCEDURE — 90847 FAMILY PSYTX W/PT 50 MIN: CPT

## 2025-06-12 DIAGNOSIS — F10.20 ALCOHOL DEPENDENCE, UNCOMPLICATED: ICD-10-CM

## 2025-06-18 ENCOUNTER — APPOINTMENT (OUTPATIENT)
Dept: PSYCHIATRY | Facility: CLINIC | Age: 50
End: 2025-06-18

## 2025-06-18 ENCOUNTER — OUTPATIENT (OUTPATIENT)
Dept: OUTPATIENT SERVICES | Facility: HOSPITAL | Age: 50
LOS: 1 days | End: 2025-06-18
Payer: MEDICARE

## 2025-06-18 DIAGNOSIS — F10.20 ALCOHOL DEPENDENCE, UNCOMPLICATED: ICD-10-CM

## 2025-06-18 DIAGNOSIS — Z99.2 DEPENDENCE ON RENAL DIALYSIS: Chronic | ICD-10-CM

## 2025-06-18 PROCEDURE — 90847 FAMILY PSYTX W/PT 50 MIN: CPT

## 2025-06-19 DIAGNOSIS — F10.20 ALCOHOL DEPENDENCE, UNCOMPLICATED: ICD-10-CM

## 2025-06-23 ENCOUNTER — APPOINTMENT (OUTPATIENT)
Dept: PSYCHIATRY | Facility: CLINIC | Age: 50
End: 2025-06-23

## 2025-06-23 ENCOUNTER — OUTPATIENT (OUTPATIENT)
Dept: OUTPATIENT SERVICES | Facility: HOSPITAL | Age: 50
LOS: 1 days | End: 2025-06-23
Payer: MEDICARE

## 2025-06-23 DIAGNOSIS — Z99.2 DEPENDENCE ON RENAL DIALYSIS: Chronic | ICD-10-CM

## 2025-06-23 DIAGNOSIS — F10.20 ALCOHOL DEPENDENCE, UNCOMPLICATED: ICD-10-CM

## 2025-06-23 PROCEDURE — 90832 PSYTX W PT 30 MINUTES: CPT

## 2025-06-24 DIAGNOSIS — F10.20 ALCOHOL DEPENDENCE, UNCOMPLICATED: ICD-10-CM

## 2025-06-25 ENCOUNTER — APPOINTMENT (OUTPATIENT)
Dept: PSYCHIATRY | Facility: CLINIC | Age: 50
End: 2025-06-25

## 2025-06-25 ENCOUNTER — OUTPATIENT (OUTPATIENT)
Dept: OUTPATIENT SERVICES | Facility: HOSPITAL | Age: 50
LOS: 1 days | End: 2025-06-25
Payer: MEDICARE

## 2025-06-25 DIAGNOSIS — Z99.2 DEPENDENCE ON RENAL DIALYSIS: Chronic | ICD-10-CM

## 2025-06-25 DIAGNOSIS — F10.20 ALCOHOL DEPENDENCE, UNCOMPLICATED: ICD-10-CM

## 2025-06-25 PROCEDURE — 90847 FAMILY PSYTX W/PT 50 MIN: CPT

## 2025-06-26 DIAGNOSIS — F10.20 ALCOHOL DEPENDENCE, UNCOMPLICATED: ICD-10-CM

## 2025-06-30 ENCOUNTER — OUTPATIENT (OUTPATIENT)
Dept: OUTPATIENT SERVICES | Facility: HOSPITAL | Age: 50
LOS: 1 days | End: 2025-06-30
Payer: MEDICARE

## 2025-06-30 ENCOUNTER — APPOINTMENT (OUTPATIENT)
Dept: PSYCHIATRY | Facility: CLINIC | Age: 50
End: 2025-06-30

## 2025-06-30 DIAGNOSIS — Z99.2 DEPENDENCE ON RENAL DIALYSIS: Chronic | ICD-10-CM

## 2025-06-30 DIAGNOSIS — F10.20 ALCOHOL DEPENDENCE, UNCOMPLICATED: ICD-10-CM

## 2025-06-30 PROCEDURE — 90832 PSYTX W PT 30 MINUTES: CPT

## 2025-07-01 DIAGNOSIS — F10.20 ALCOHOL DEPENDENCE, UNCOMPLICATED: ICD-10-CM

## 2025-07-02 ENCOUNTER — APPOINTMENT (OUTPATIENT)
Dept: PSYCHIATRY | Facility: CLINIC | Age: 50
End: 2025-07-02

## 2025-07-02 ENCOUNTER — OUTPATIENT (OUTPATIENT)
Dept: OUTPATIENT SERVICES | Facility: HOSPITAL | Age: 50
LOS: 1 days | End: 2025-07-02
Payer: MEDICARE

## 2025-07-02 DIAGNOSIS — Z99.2 DEPENDENCE ON RENAL DIALYSIS: Chronic | ICD-10-CM

## 2025-07-02 DIAGNOSIS — F10.20 ALCOHOL DEPENDENCE, UNCOMPLICATED: ICD-10-CM

## 2025-07-02 PROCEDURE — 90847 FAMILY PSYTX W/PT 50 MIN: CPT

## 2025-07-03 DIAGNOSIS — F10.20 ALCOHOL DEPENDENCE, UNCOMPLICATED: ICD-10-CM

## 2025-07-07 ENCOUNTER — OUTPATIENT (OUTPATIENT)
Dept: OUTPATIENT SERVICES | Facility: HOSPITAL | Age: 50
LOS: 1 days | End: 2025-07-07
Payer: MEDICARE

## 2025-07-07 ENCOUNTER — APPOINTMENT (OUTPATIENT)
Dept: PSYCHIATRY | Facility: CLINIC | Age: 50
End: 2025-07-07

## 2025-07-07 DIAGNOSIS — Z99.2 DEPENDENCE ON RENAL DIALYSIS: Chronic | ICD-10-CM

## 2025-07-07 DIAGNOSIS — F10.20 ALCOHOL DEPENDENCE, UNCOMPLICATED: ICD-10-CM

## 2025-07-07 PROCEDURE — 90832 PSYTX W PT 30 MINUTES: CPT

## 2025-07-08 DIAGNOSIS — F10.20 ALCOHOL DEPENDENCE, UNCOMPLICATED: ICD-10-CM

## 2025-07-14 ENCOUNTER — APPOINTMENT (OUTPATIENT)
Dept: PSYCHIATRY | Facility: CLINIC | Age: 50
End: 2025-07-14

## 2025-07-14 ENCOUNTER — OUTPATIENT (OUTPATIENT)
Dept: OUTPATIENT SERVICES | Facility: HOSPITAL | Age: 50
LOS: 1 days | End: 2025-07-14
Payer: MEDICARE

## 2025-07-14 DIAGNOSIS — F10.20 ALCOHOL DEPENDENCE, UNCOMPLICATED: ICD-10-CM

## 2025-07-14 DIAGNOSIS — Z99.2 DEPENDENCE ON RENAL DIALYSIS: Chronic | ICD-10-CM

## 2025-07-14 PROCEDURE — 90832 PSYTX W PT 30 MINUTES: CPT

## 2025-07-15 DIAGNOSIS — F10.20 ALCOHOL DEPENDENCE, UNCOMPLICATED: ICD-10-CM

## 2025-07-16 ENCOUNTER — APPOINTMENT (OUTPATIENT)
Dept: PSYCHIATRY | Facility: CLINIC | Age: 50
End: 2025-07-16

## 2025-07-16 ENCOUNTER — OUTPATIENT (OUTPATIENT)
Dept: OUTPATIENT SERVICES | Facility: HOSPITAL | Age: 50
LOS: 1 days | End: 2025-07-16
Payer: MEDICARE

## 2025-07-16 DIAGNOSIS — Z99.2 DEPENDENCE ON RENAL DIALYSIS: Chronic | ICD-10-CM

## 2025-07-16 DIAGNOSIS — F10.20 ALCOHOL DEPENDENCE, UNCOMPLICATED: ICD-10-CM

## 2025-07-16 PROCEDURE — 90847 FAMILY PSYTX W/PT 50 MIN: CPT

## 2025-07-17 DIAGNOSIS — F10.20 ALCOHOL DEPENDENCE, UNCOMPLICATED: ICD-10-CM

## 2025-07-21 ENCOUNTER — OUTPATIENT (OUTPATIENT)
Dept: OUTPATIENT SERVICES | Facility: HOSPITAL | Age: 50
LOS: 1 days | End: 2025-07-21
Payer: MEDICARE

## 2025-07-21 ENCOUNTER — APPOINTMENT (OUTPATIENT)
Dept: PSYCHIATRY | Facility: CLINIC | Age: 50
End: 2025-07-21

## 2025-07-21 DIAGNOSIS — F10.20 ALCOHOL DEPENDENCE, UNCOMPLICATED: ICD-10-CM

## 2025-07-21 DIAGNOSIS — Z99.2 DEPENDENCE ON RENAL DIALYSIS: Chronic | ICD-10-CM

## 2025-07-21 PROCEDURE — 90832 PSYTX W PT 30 MINUTES: CPT

## 2025-07-22 DIAGNOSIS — F10.20 ALCOHOL DEPENDENCE, UNCOMPLICATED: ICD-10-CM

## 2025-07-23 ENCOUNTER — OUTPATIENT (OUTPATIENT)
Dept: OUTPATIENT SERVICES | Facility: HOSPITAL | Age: 50
LOS: 1 days | End: 2025-07-23
Payer: MEDICARE

## 2025-07-23 ENCOUNTER — APPOINTMENT (OUTPATIENT)
Dept: PSYCHIATRY | Facility: CLINIC | Age: 50
End: 2025-07-23

## 2025-07-23 DIAGNOSIS — F10.20 ALCOHOL DEPENDENCE, UNCOMPLICATED: ICD-10-CM

## 2025-07-23 DIAGNOSIS — Z99.2 DEPENDENCE ON RENAL DIALYSIS: Chronic | ICD-10-CM

## 2025-07-23 PROCEDURE — 90847 FAMILY PSYTX W/PT 50 MIN: CPT

## 2025-07-24 DIAGNOSIS — F10.20 ALCOHOL DEPENDENCE, UNCOMPLICATED: ICD-10-CM

## 2025-07-28 ENCOUNTER — APPOINTMENT (OUTPATIENT)
Dept: PSYCHIATRY | Facility: CLINIC | Age: 50
End: 2025-07-28

## 2025-07-28 ENCOUNTER — OUTPATIENT (OUTPATIENT)
Dept: OUTPATIENT SERVICES | Facility: HOSPITAL | Age: 50
LOS: 1 days | End: 2025-07-28
Payer: MEDICARE

## 2025-07-28 DIAGNOSIS — F10.20 ALCOHOL DEPENDENCE, UNCOMPLICATED: ICD-10-CM

## 2025-07-28 DIAGNOSIS — Z99.2 DEPENDENCE ON RENAL DIALYSIS: Chronic | ICD-10-CM

## 2025-07-28 PROCEDURE — 90832 PSYTX W PT 30 MINUTES: CPT

## 2025-07-29 DIAGNOSIS — F10.20 ALCOHOL DEPENDENCE, UNCOMPLICATED: ICD-10-CM

## 2025-07-30 ENCOUNTER — APPOINTMENT (OUTPATIENT)
Dept: PSYCHIATRY | Facility: CLINIC | Age: 50
End: 2025-07-30

## 2025-08-06 ENCOUNTER — OUTPATIENT (OUTPATIENT)
Dept: OUTPATIENT SERVICES | Facility: HOSPITAL | Age: 50
LOS: 1 days | End: 2025-08-06
Payer: MEDICARE

## 2025-08-06 ENCOUNTER — APPOINTMENT (OUTPATIENT)
Dept: PSYCHIATRY | Facility: CLINIC | Age: 50
End: 2025-08-06

## 2025-08-06 DIAGNOSIS — F10.20 ALCOHOL DEPENDENCE, UNCOMPLICATED: ICD-10-CM

## 2025-08-06 DIAGNOSIS — Z99.2 DEPENDENCE ON RENAL DIALYSIS: Chronic | ICD-10-CM

## 2025-08-06 PROCEDURE — 90847 FAMILY PSYTX W/PT 50 MIN: CPT

## 2025-08-07 DIAGNOSIS — F10.20 ALCOHOL DEPENDENCE, UNCOMPLICATED: ICD-10-CM

## 2025-08-11 ENCOUNTER — APPOINTMENT (OUTPATIENT)
Dept: PSYCHIATRY | Facility: CLINIC | Age: 50
End: 2025-08-11

## 2025-08-11 ENCOUNTER — OUTPATIENT (OUTPATIENT)
Dept: OUTPATIENT SERVICES | Facility: HOSPITAL | Age: 50
LOS: 1 days | End: 2025-08-11
Payer: MEDICARE

## 2025-08-11 DIAGNOSIS — Z99.2 DEPENDENCE ON RENAL DIALYSIS: Chronic | ICD-10-CM

## 2025-08-11 DIAGNOSIS — F10.20 ALCOHOL DEPENDENCE, UNCOMPLICATED: ICD-10-CM

## 2025-08-11 PROCEDURE — 90832 PSYTX W PT 30 MINUTES: CPT

## 2025-08-12 DIAGNOSIS — F10.20 ALCOHOL DEPENDENCE, UNCOMPLICATED: ICD-10-CM

## 2025-08-13 ENCOUNTER — OUTPATIENT (OUTPATIENT)
Dept: OUTPATIENT SERVICES | Facility: HOSPITAL | Age: 50
LOS: 1 days | End: 2025-08-13
Payer: MEDICARE

## 2025-08-13 DIAGNOSIS — F10.20 ALCOHOL DEPENDENCE, UNCOMPLICATED: ICD-10-CM

## 2025-08-13 DIAGNOSIS — Z99.2 DEPENDENCE ON RENAL DIALYSIS: Chronic | ICD-10-CM

## 2025-08-13 PROCEDURE — 90847 FAMILY PSYTX W/PT 50 MIN: CPT

## 2025-08-14 DIAGNOSIS — F10.20 ALCOHOL DEPENDENCE, UNCOMPLICATED: ICD-10-CM

## 2025-08-18 ENCOUNTER — OUTPATIENT (OUTPATIENT)
Dept: OUTPATIENT SERVICES | Facility: HOSPITAL | Age: 50
LOS: 1 days | End: 2025-08-18
Payer: MEDICARE

## 2025-08-18 ENCOUNTER — APPOINTMENT (OUTPATIENT)
Dept: PSYCHIATRY | Facility: CLINIC | Age: 50
End: 2025-08-18

## 2025-08-18 DIAGNOSIS — F10.20 ALCOHOL DEPENDENCE, UNCOMPLICATED: ICD-10-CM

## 2025-08-18 DIAGNOSIS — Z99.2 DEPENDENCE ON RENAL DIALYSIS: Chronic | ICD-10-CM

## 2025-08-18 PROCEDURE — 90832 PSYTX W PT 30 MINUTES: CPT

## 2025-08-19 DIAGNOSIS — F10.20 ALCOHOL DEPENDENCE, UNCOMPLICATED: ICD-10-CM

## 2025-08-20 ENCOUNTER — APPOINTMENT (OUTPATIENT)
Dept: PSYCHIATRY | Facility: CLINIC | Age: 50
End: 2025-08-20

## 2025-08-25 ENCOUNTER — APPOINTMENT (OUTPATIENT)
Dept: PSYCHIATRY | Facility: CLINIC | Age: 50
End: 2025-08-25

## 2025-08-25 ENCOUNTER — OUTPATIENT (OUTPATIENT)
Dept: OUTPATIENT SERVICES | Facility: HOSPITAL | Age: 50
LOS: 1 days | End: 2025-08-25
Payer: MEDICARE

## 2025-08-25 DIAGNOSIS — Z99.2 DEPENDENCE ON RENAL DIALYSIS: Chronic | ICD-10-CM

## 2025-08-25 DIAGNOSIS — F10.20 ALCOHOL DEPENDENCE, UNCOMPLICATED: ICD-10-CM

## 2025-08-25 PROCEDURE — 90832 PSYTX W PT 30 MINUTES: CPT

## 2025-08-26 DIAGNOSIS — F10.20 ALCOHOL DEPENDENCE, UNCOMPLICATED: ICD-10-CM

## 2025-08-27 ENCOUNTER — APPOINTMENT (OUTPATIENT)
Dept: PSYCHIATRY | Facility: CLINIC | Age: 50
End: 2025-08-27

## 2025-09-03 ENCOUNTER — APPOINTMENT (OUTPATIENT)
Dept: PSYCHIATRY | Facility: CLINIC | Age: 50
End: 2025-09-03

## 2025-09-08 ENCOUNTER — APPOINTMENT (OUTPATIENT)
Dept: PSYCHIATRY | Facility: CLINIC | Age: 50
End: 2025-09-08

## 2025-09-10 ENCOUNTER — APPOINTMENT (OUTPATIENT)
Dept: PSYCHIATRY | Facility: CLINIC | Age: 50
End: 2025-09-10

## 2025-09-15 ENCOUNTER — APPOINTMENT (OUTPATIENT)
Dept: PSYCHIATRY | Facility: CLINIC | Age: 50
End: 2025-09-15

## 2025-09-17 ENCOUNTER — APPOINTMENT (OUTPATIENT)
Dept: PSYCHIATRY | Facility: CLINIC | Age: 50
End: 2025-09-17